# Patient Record
Sex: FEMALE | Race: WHITE
[De-identification: names, ages, dates, MRNs, and addresses within clinical notes are randomized per-mention and may not be internally consistent; named-entity substitution may affect disease eponyms.]

---
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
## 2021-06-04 ENCOUNTER — APPOINTMENT (OUTPATIENT)
Dept: RADIOLOGY | Facility: MEDICAL CENTER | Age: 21
End: 2021-06-04
Attending: EMERGENCY MEDICINE
Payer: COMMERCIAL

## 2021-06-04 ENCOUNTER — HOSPITAL ENCOUNTER (EMERGENCY)
Facility: MEDICAL CENTER | Age: 21
End: 2021-06-04
Attending: EMERGENCY MEDICINE
Payer: COMMERCIAL

## 2021-06-04 VITALS
HEIGHT: 71 IN | DIASTOLIC BLOOD PRESSURE: 68 MMHG | TEMPERATURE: 97.7 F | HEART RATE: 62 BPM | OXYGEN SATURATION: 98 % | RESPIRATION RATE: 18 BRPM | BODY MASS INDEX: 26.6 KG/M2 | WEIGHT: 190 LBS | SYSTOLIC BLOOD PRESSURE: 128 MMHG
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
## 2021-06-04 DIAGNOSIS — T07.XXXA MULTIPLE CONTUSIONS: ICD-10-CM

## 2021-06-04 DIAGNOSIS — S13.4XXA WHIPLASH INJURY TO NECK, INITIAL ENCOUNTER: ICD-10-CM

## 2021-06-04 LAB
ABO GROUP BLD: NORMAL
ALBUMIN SERPL BCP-MCNC: 4 G/DL (ref 3.2–4.9)
ALBUMIN/GLOB SERPL: 1.7 G/DL
ALP SERPL-CCNC: 59 U/L (ref 30–99)
ALT SERPL-CCNC: 32 U/L (ref 2–50)
ANION GAP SERPL CALC-SCNC: 11 MMOL/L (ref 7–16)
AST SERPL-CCNC: 33 U/L (ref 12–45)
BILIRUB SERPL-MCNC: 0.5 MG/DL (ref 0.1–1.5)
BLD GP AB SCN SERPL QL: NORMAL
BUN SERPL-MCNC: 4 MG/DL (ref 8–22)
CALCIUM SERPL-MCNC: 8.9 MG/DL (ref 8.5–10.5)
CHLORIDE SERPL-SCNC: 111 MMOL/L (ref 96–112)
CO2 SERPL-SCNC: 19 MMOL/L (ref 20–33)
CREAT SERPL-MCNC: 0.7 MG/DL (ref 0.5–1.4)
ERYTHROCYTE [DISTWIDTH] IN BLOOD BY AUTOMATED COUNT: 38.5 FL (ref 35.9–50)
ETHANOL BLD-MCNC: <10.1 MG/DL (ref 0–10)
GLOBULIN SER CALC-MCNC: 2.3 G/DL (ref 1.9–3.5)
GLUCOSE SERPL-MCNC: 119 MG/DL (ref 65–99)
HCG SERPL QL: NEGATIVE
HCT VFR BLD AUTO: 46.4 % (ref 37–47)
HGB BLD-MCNC: 15.7 G/DL (ref 12–16)
MCH RBC QN AUTO: 28.6 PG (ref 27–33)
MCHC RBC AUTO-ENTMCNC: 33.8 G/DL (ref 33.6–35)
MCV RBC AUTO: 84.5 FL (ref 81.4–97.8)
PLATELET # BLD AUTO: 246 K/UL (ref 164–446)
PMV BLD AUTO: 10 FL (ref 9–12.9)
POTASSIUM SERPL-SCNC: 3.4 MMOL/L (ref 3.6–5.5)
PROT SERPL-MCNC: 6.3 G/DL (ref 6–8.2)
RBC # BLD AUTO: 5.49 M/UL (ref 4.2–5.4)
RH BLD: NORMAL
SODIUM SERPL-SCNC: 141 MMOL/L (ref 135–145)
WBC # BLD AUTO: 7.1 K/UL (ref 4.8–10.8)

## 2021-06-04 PROCEDURE — 700117 HCHG RX CONTRAST REV CODE 255: Performed by: EMERGENCY MEDICINE

## 2021-06-04 PROCEDURE — 86901 BLOOD TYPING SEROLOGIC RH(D): CPT

## 2021-06-04 PROCEDURE — 99285 EMERGENCY DEPT VISIT HI MDM: CPT

## 2021-06-04 PROCEDURE — 72131 CT LUMBAR SPINE W/O DYE: CPT

## 2021-06-04 PROCEDURE — 72128 CT CHEST SPINE W/O DYE: CPT

## 2021-06-04 PROCEDURE — 73560 X-RAY EXAM OF KNEE 1 OR 2: CPT | Mod: RT

## 2021-06-04 PROCEDURE — 80053 COMPREHEN METABOLIC PANEL: CPT

## 2021-06-04 PROCEDURE — 96374 THER/PROPH/DIAG INJ IV PUSH: CPT

## 2021-06-04 PROCEDURE — 86900 BLOOD TYPING SEROLOGIC ABO: CPT

## 2021-06-04 PROCEDURE — 70450 CT HEAD/BRAIN W/O DYE: CPT

## 2021-06-04 PROCEDURE — 71260 CT THORAX DX C+: CPT

## 2021-06-04 PROCEDURE — 84703 CHORIONIC GONADOTROPIN ASSAY: CPT

## 2021-06-04 PROCEDURE — 700111 HCHG RX REV CODE 636 W/ 250 OVERRIDE (IP): Performed by: EMERGENCY MEDICINE

## 2021-06-04 PROCEDURE — 82077 ASSAY SPEC XCP UR&BREATH IA: CPT

## 2021-06-04 PROCEDURE — 86850 RBC ANTIBODY SCREEN: CPT

## 2021-06-04 PROCEDURE — 72125 CT NECK SPINE W/O DYE: CPT

## 2021-06-04 PROCEDURE — 71045 X-RAY EXAM CHEST 1 VIEW: CPT

## 2021-06-04 PROCEDURE — 85027 COMPLETE CBC AUTOMATED: CPT

## 2021-06-04 PROCEDURE — 305948 HCHG GREEN TRAUMA ACT PRE-NOTIFY NO CC

## 2021-06-04 RX ORDER — IBUPROFEN 600 MG/1
600 TABLET ORAL EVERY 6 HOURS PRN
Qty: 30 TABLET | Refills: 0 | Status: SHIPPED | OUTPATIENT
Start: 2021-06-04 | End: 2021-06-04 | Stop reason: SDUPTHER

## 2021-06-04 RX ORDER — IBUPROFEN 600 MG/1
600 TABLET ORAL EVERY 6 HOURS PRN
Qty: 30 TABLET | Refills: 0 | Status: SHIPPED | OUTPATIENT
Start: 2021-06-04 | End: 2021-10-06

## 2021-06-04 RX ORDER — CYCLOBENZAPRINE HCL 10 MG
10 TABLET ORAL 3 TIMES DAILY PRN
Qty: 30 TABLET | Refills: 0 | Status: SHIPPED | OUTPATIENT
Start: 2021-06-04 | End: 2021-10-06

## 2021-06-04 RX ORDER — CYCLOBENZAPRINE HCL 10 MG
10 TABLET ORAL 3 TIMES DAILY PRN
Qty: 30 TABLET | Refills: 0 | Status: SHIPPED | OUTPATIENT
Start: 2021-06-04 | End: 2021-06-04 | Stop reason: SDUPTHER

## 2021-06-04 RX ADMIN — IOHEXOL 100 ML: 350 INJECTION, SOLUTION INTRAVENOUS at 14:24

## 2021-06-04 RX ADMIN — FENTANYL CITRATE 100 MCG: 50 INJECTION, SOLUTION INTRAMUSCULAR; INTRAVENOUS at 13:42
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
## 2021-06-11 PROBLEM — S62.336A DISPLACED FRACTURE OF NECK OF FIFTH METACARPAL BONE, RIGHT HAND, INITIAL ENCOUNTER FOR CLOSED FRACTURE: Status: ACTIVE | Noted: 2021-06-11

## 2021-10-06 ENCOUNTER — OFFICE VISIT (OUTPATIENT)
Dept: URGENT CARE | Facility: CLINIC | Age: 21
End: 2021-10-06
Payer: COMMERCIAL

## 2021-10-06 ENCOUNTER — APPOINTMENT (OUTPATIENT)
Dept: RADIOLOGY | Facility: IMAGING CENTER | Age: 21
End: 2021-10-06
Attending: PHYSICIAN ASSISTANT
Payer: COMMERCIAL

## 2021-10-06 ENCOUNTER — HOSPITAL ENCOUNTER (EMERGENCY)
Facility: MEDICAL CENTER | Age: 21
End: 2021-10-07
Attending: EMERGENCY MEDICINE
Payer: COMMERCIAL

## 2021-10-06 VITALS
WEIGHT: 186.6 LBS | HEART RATE: 79 BPM | HEIGHT: 71 IN | DIASTOLIC BLOOD PRESSURE: 80 MMHG | OXYGEN SATURATION: 98 % | RESPIRATION RATE: 16 BRPM | TEMPERATURE: 98.1 F | BODY MASS INDEX: 26.12 KG/M2 | SYSTOLIC BLOOD PRESSURE: 120 MMHG

## 2021-10-06 DIAGNOSIS — S69.91XA HAND INJURY, RIGHT, INITIAL ENCOUNTER: ICD-10-CM

## 2021-10-06 DIAGNOSIS — S62.397A OTHER FRACTURE OF FIFTH METACARPAL BONE, LEFT HAND, INITIAL ENCOUNTER FOR CLOSED FRACTURE: ICD-10-CM

## 2021-10-06 DIAGNOSIS — S62.339A CLOSED BOXER'S FRACTURE, INITIAL ENCOUNTER: ICD-10-CM

## 2021-10-06 PROCEDURE — 99215 OFFICE O/P EST HI 40 MIN: CPT | Performed by: PHYSICIAN ASSISTANT

## 2021-10-06 PROCEDURE — 700101 HCHG RX REV CODE 250: Performed by: EMERGENCY MEDICINE

## 2021-10-06 PROCEDURE — 99283 EMERGENCY DEPT VISIT LOW MDM: CPT

## 2021-10-06 PROCEDURE — 73130 X-RAY EXAM OF HAND: CPT | Mod: TC,RT | Performed by: PHYSICIAN ASSISTANT

## 2021-10-06 PROCEDURE — 26605 TREAT METACARPAL FRACTURE: CPT

## 2021-10-06 RX ORDER — BUPIVACAINE HYDROCHLORIDE AND EPINEPHRINE 5; 5 MG/ML; UG/ML
10 INJECTION, SOLUTION EPIDURAL; INTRACAUDAL; PERINEURAL ONCE
Status: COMPLETED | OUTPATIENT
Start: 2021-10-06 | End: 2021-10-06

## 2021-10-06 RX ADMIN — BUPIVACAINE HYDROCHLORIDE AND EPINEPHRINE BITARTRATE 10 ML: 5; .005 INJECTION, SOLUTION EPIDURAL; INTRACAUDAL; PERINEURAL at 22:30

## 2021-10-06 ASSESSMENT — FIBROSIS 4 INDEX
FIB4 SCORE: 0.47
FIB4 SCORE: 0.47

## 2021-10-07 ENCOUNTER — APPOINTMENT (OUTPATIENT)
Dept: RADIOLOGY | Facility: MEDICAL CENTER | Age: 21
End: 2021-10-07
Attending: EMERGENCY MEDICINE
Payer: COMMERCIAL

## 2021-10-07 VITALS
HEIGHT: 68 IN | BODY MASS INDEX: 28.4 KG/M2 | OXYGEN SATURATION: 99 % | HEART RATE: 68 BPM | TEMPERATURE: 97.2 F | DIASTOLIC BLOOD PRESSURE: 70 MMHG | RESPIRATION RATE: 18 BRPM | SYSTOLIC BLOOD PRESSURE: 107 MMHG | WEIGHT: 187.39 LBS

## 2021-10-07 PROCEDURE — 73120 X-RAY EXAM OF HAND: CPT | Mod: RT

## 2021-10-07 ASSESSMENT — ENCOUNTER SYMPTOMS
NAUSEA: 0
FEVER: 0
TINGLING: 1
SENSORY CHANGE: 1
VOMITING: 0
NUMBNESS: 1
CHILLS: 0
WEAKNESS: 0
ARTHRALGIAS: 1
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
## 2021-10-07 NOTE — PROGRESS NOTES
"Subjective     Clair Garrett is a 21 y.o. female who presents with Hand Injury ((R) punched window, limited movement in the hand)            Hand Injury  This is a new problem. The current episode started today (Patient punched a window a few hours ago.. Now has right hand pain and swelling). The problem occurs constantly. The problem has been unchanged. Associated symptoms include arthralgias (right hand pain) and numbness (numbness and sensory loss to right 3rd-5th digits). Pertinent negatives include no chills, fever, nausea, rash, vomiting or weakness. The symptoms are aggravated by bending (palpation, movement). She has tried ice for the symptoms. The treatment provided mild relief.     Past Medical History:   Diagnosis Date   • ADHD    • ADHD (attention deficit hyperactivity disorder) 4/17/2011   • Bipolar disorder (HCC)    • Mood disorder (HCC) 4/17/2011   • Sensory disorder        Past Surgical History:   Procedure Laterality Date   • TONSILLECTOMY         No family history on file.    No Known Allergies    Medications, Allergies, and current problem list reviewed today in Epic        Review of Systems   Constitutional: Negative for chills and fever.   Gastrointestinal: Negative for nausea and vomiting.   Musculoskeletal: Positive for arthralgias (right hand pain) and joint pain (right hand pain).   Skin: Negative for rash.   Neurological: Positive for tingling, sensory change and numbness (numbness and sensory loss to right 3rd-5th digits). Negative for weakness.     All other systems reviewed and are negative.              Objective     /80   Pulse 79   Temp 36.7 °C (98.1 °F) (Temporal)   Resp 16   Ht 1.803 m (5' 11\")   Wt 84.6 kg (186 lb 9.6 oz)   SpO2 98%   BMI 26.03 kg/m²      Physical Exam  Constitutional:       General: She is not in acute distress.  HENT:      Head: Normocephalic and atraumatic.   Eyes:      Conjunctiva/sclera: Conjunctivae normal.   Cardiovascular:      Rate " and Rhythm: Normal rate.   Pulmonary:      Effort: Pulmonary effort is normal. No respiratory distress.   Musculoskeletal:        Hands:    Skin:     General: Skin is warm and dry.      Findings: Bruising present. No erythema.   Neurological:      General: No focal deficit present.      Mental Status: She is alert and oriented to person, place, and time.   Psychiatric:         Mood and Affect: Mood normal.         Behavior: Behavior normal.         Thought Content: Thought content normal.         Judgment: Judgment normal.                         10/6/2021 8:24 PM     HISTORY/REASON FOR EXAM: Pain/Deformity Following Trauma     TECHNIQUE/EXAM DESCRIPTION:  AP, lateral, and oblique views of the RIGHT hand.     COMPARISON:  None     FINDINGS:     There is fifth metacarpal neck fracture with apex dorsal angulation. Overlying soft tissue swung is seen.     IMPRESSION:        1.  Fifth metacarpal boxer's fracture    Assessment & Plan        1. Closed boxer's fracture, initial encounter  X-ray reviewed by me  Angulation about 70 degrees- threshold is 40 degrees  Patient referred to ED for ortho consult and likely reduction. Patient agrees to go to ER.  Patient placed in ace wrap and splint.  - DX-HAND 3+ RIGHT; Future         Differential diagnoses, Supportive care, and indications for immediate follow-up discussed with patient.   Pathogenesis of diagnosis discussed including typical length and natural progression.   Instructed to return to clinic or nearest emergency department for any change in condition, further concerns, or worsening of symptoms.    The patient demonstrated a good understanding and agreed with the treatment plan.    Simi Toscano P.A.-C.

## 2021-10-07 NOTE — ED PROVIDER NOTES
ED Provider Note      Means of Arrival: Private Vehicle  History obtained from: Patient, medical record    CHIEF COMPLAINT  Chief Complaint   Patient presents with   • Hand Pain     Patient report of right hand swelling and pain after hitting a car window.  Seen at the Urgent Care and sent here for further evaluation.    • Hand Swelling       HPI  Clair Garrett is a 21 y.o. female who presents with right hand pain.  Patient punched a car window earlier in the day.  She reports pain to the ulnar aspect of her right hand.  She went to urgent care was diagnosed with a boxer's fracture but there was too much angulation and they sent her here for further intervention.  She denies any other injuries.    REVIEW OF SYSTEMS  CONSTITUTIONAL:  No fever.  CARDIOVASCULAR:  No chest discomfort.  RESPIRATORY:  No pleuritic chest pain.  GASTROINTESTINAL:  No abdominal pain.  GENITOURINARY:   No dysuria.    See HPI for further details.   All other systems are negative.     PAST MEDICAL HISTORY  Past Medical History:   Diagnosis Date   • ADHD    • ADHD (attention deficit hyperactivity disorder) 4/17/2011   • Bipolar disorder (HCC)    • Mood disorder (HCC) 4/17/2011   • Sensory disorder        FAMILY HISTORY  History reviewed. No pertinent family history.    SOCIAL HISTORY   reports that she has been smoking cigarettes. She started smoking about 7 years ago. She has been smoking about 1.00 pack per day. She has never used smokeless tobacco. She reports that she does not drink alcohol and does not use drugs.    SURGICAL HISTORY  Past Surgical History:   Procedure Laterality Date   • TONSILLECTOMY         CURRENT MEDICATIONS  Home Medications     Reviewed by Shivani Pascal R.N. (Registered Nurse) on 10/06/21 at 8432  Med List Status: Unable to Obtain   Medication Last Dose Status   cyclobenzaprine (FLEXERIL) 10 mg Tab  Active   hydrOXYzine HCl (ATARAX) 50 MG Tab  Active   ibuprofen (MOTRIN) 600 MG Tab  Active   lamoTRIgine  "(LAMICTAL) 100 MG Tab  Active   melatonin 3 MG Tab  Active   sertraline (ZOLOFT) 50 MG Tab  Active   topiramate (TOPAMAX) 100 MG Tab  Active                ALLERGIES  No Known Allergies    PHYSICAL EXAM  VITAL SIGNS: /70   Pulse 68   Temp 36.2 °C (97.2 °F) (Temporal)   Resp 18   Ht 1.727 m (5' 8\")   Wt 85 kg (187 lb 6.3 oz)   LMP  (LMP Unknown)   SpO2 99%   BMI 28.49 kg/m²    Gen: Alert  HENT: ATNC  Eyes: Normal conjunctiva  Neck: trachea midline  Resp: no respiratory distress  CV: No JVD, RRR  Abd: non-distended  Ext: No deformities swelling and tenderness over right fifth metacarpal.  Distal CSM intact  Psych: normal mood  Neuro: speech fluent.  Moves all extremities, GCS 15      RADIOLOGY/PROCEDURES  DX-HAND 2- RIGHT   Final Result         1.  Fifth metacarpal head and neck fracture with interval reduction      Review of x-ray from outside record demonstrates angulated fifth metacarpal fracture.  Closed.    Procedure fracture reduction  Indication: Angulated closed fifth metacarpal fracture.  Anesthesia: Hematoma block of 0.5% bupivacaine with epinephrine, 3 mL  CSM intact before and after procedure.  Direct manipulation was used to better align the fragments of the fracture.  There were no immediate complications    Procedure physician applied splint  Indication: Fifth metacarpal fracture.  After reduction, an ulnar gutter splint was applied using plaster.  Postreduction films obtained.  There were no immediate complications.  Distal CSM intact after application        COURSE & MEDICAL DECISION MAKING  Pertinent Labs & Imaging studies reviewed. (See chart for details)    Patient presents with fifth metacarpal fracture.  There is some angulation on the initial x-ray.  This was attempted to be better aligned and placed into an ulnar gutter splint.  Patient referred to orthopedics for follow-up.    The patient was given return precautions, anticipatory guidance, and the opportunity to ask questions " prior to discharge.     Appropriate PPE were worn at this encounter.     FINAL IMPRESSION  1. Other fracture of fifth metacarpal bone, left hand, initial encounter for closed fracture         DISPOSITION:  Patient will be discharged home in stable condition.    FOLLOW UP:  [facility]  [address]  [facility] [phone]  Schedule an appointment as soon as possible for a visit       [facility], Emergency Dept  [address]  [facility] [address] [phone]    If symptoms worsen        This dictation was created using voice recognition software. The accuracy of the dictation is limited to the abilities of the software. I expect there may be some errors of grammar and possibly content. The nursing notes were reviewed and certain aspects of this information were incorporated into this note.

## 2021-10-07 NOTE — DISCHARGE INSTRUCTIONS
You were seen in the emergency department with hand pain.  Your broken bones was reduced into better alignment.  Please keep the splint on until you follow-up with orthopedics.    Please keep your hand elevated.  You may also use ice to help reduce pain and swelling.        For pain you can take acetaminophen (Tylenol), 1000mg every 8 hours as needed for pain. Do not take more than 3000mg of acetaminophen in any 24 hour period.  As long as you are not pregnant, you can also take  ibuprofen (Motrin), 600mg every 6 hours as needed for pain (take with food to avoid GI upset).  Taking these medications regularly during the day can be very effective in controlling pain.    Please return to the emergency department or seek medical attention if you develop:  Cold dusky fingers, uncontrollable pain, any other new or concerning findings

## 2021-10-07 NOTE — ED NOTES
MD re-evaluated pt and she is now clear for d/c  dischg instructions given to pt  Verbally understands  D/c'ed to home in NAD mother driving home  Referral for ortho given  Both mother and pt aware to f/u with them for further care and treatment of injury     Frederic Mayer(Resident)

## 2021-10-22 ENCOUNTER — HOSPITAL ENCOUNTER (OUTPATIENT)
Facility: MEDICAL CENTER | Age: 21
End: 2021-10-22
Attending: PHYSICIAN ASSISTANT
Payer: COMMERCIAL

## 2021-10-22 ENCOUNTER — OFFICE VISIT (OUTPATIENT)
Dept: URGENT CARE | Facility: PHYSICIAN GROUP | Age: 21
End: 2021-10-22
Payer: COMMERCIAL

## 2021-10-22 VITALS
TEMPERATURE: 98 F | OXYGEN SATURATION: 97 % | DIASTOLIC BLOOD PRESSURE: 80 MMHG | RESPIRATION RATE: 18 BRPM | HEART RATE: 78 BPM | WEIGHT: 184 LBS | SYSTOLIC BLOOD PRESSURE: 120 MMHG | BODY MASS INDEX: 25.76 KG/M2 | HEIGHT: 71 IN

## 2021-10-22 DIAGNOSIS — R51.9 NONINTRACTABLE HEADACHE, UNSPECIFIED CHRONICITY PATTERN, UNSPECIFIED HEADACHE TYPE: ICD-10-CM

## 2021-10-22 DIAGNOSIS — Z20.822 CLOSE EXPOSURE TO COVID-19 VIRUS: ICD-10-CM

## 2021-10-22 LAB
EXTERNAL QUALITY CONTROL: NORMAL
SARS-COV+SARS-COV-2 AG RESP QL IA.RAPID: NEGATIVE

## 2021-10-22 PROCEDURE — U0003 INFECTIOUS AGENT DETECTION BY NUCLEIC ACID (DNA OR RNA); SEVERE ACUTE RESPIRATORY SYNDROME CORONAVIRUS 2 (SARS-COV-2) (CORONAVIRUS DISEASE [COVID-19]), AMPLIFIED PROBE TECHNIQUE, MAKING USE OF HIGH THROUGHPUT TECHNOLOGIES AS DESCRIBED BY CMS-2020-01-R: HCPCS

## 2021-10-22 PROCEDURE — 99213 OFFICE O/P EST LOW 20 MIN: CPT | Mod: CS | Performed by: PHYSICIAN ASSISTANT

## 2021-10-22 PROCEDURE — 87426 SARSCOV CORONAVIRUS AG IA: CPT | Performed by: PHYSICIAN ASSISTANT

## 2021-10-22 PROCEDURE — U0005 INFEC AGEN DETEC AMPLI PROBE: HCPCS

## 2021-10-22 ASSESSMENT — ENCOUNTER SYMPTOMS
DIZZINESS: 1
FEVER: 0
SPUTUM PRODUCTION: 0
HEADACHES: 1
WHEEZING: 0
CHILLS: 1
ABDOMINAL PAIN: 0
VOMITING: 0
SORE THROAT: 0
NAUSEA: 0
COUGH: 0
DIARRHEA: 1
MYALGIAS: 1
SHORTNESS OF BREATH: 0

## 2021-10-22 ASSESSMENT — FIBROSIS 4 INDEX: FIB4 SCORE: 0.47

## 2021-10-22 NOTE — PROGRESS NOTES
"Subjective:   Clair Garrett  is a 21 y.o. female who presents for Dizziness (migraine, dizzy, loss of taste, exposed to covid since monday. requesting a covid test. )      Dizziness  This is a new problem. The current episode started in the past 7 days (5d). Associated symptoms include chills, congestion, headaches and myalgias. Pertinent negatives include no abdominal pain, coughing, fever, nausea, rash, sore throat or vomiting.   Patient presents urgent care complaining of last 5 days of symptoms concerning for COVID-19.  Her mother is positive for Covid at home and patient has been in her proximity over this interval.  She notes some headache with mild dizziness associated.  She has a history of migraine similar symptoms.  She denies ear pain or sore throat.  She denies cough.  She complains of body aches chills diarrhea.  She denies nausea vomiting or abdominal pain.  She denies history of Covid vaccination.  Denies loss of smell but has had abnormal taste for the last few days.  Denies history of asthma bronchitis or pneumonia.    Review of Systems   Constitutional: Positive for chills. Negative for fever.   HENT: Positive for congestion. Negative for ear pain and sore throat.    Respiratory: Negative for cough, sputum production, shortness of breath and wheezing.    Gastrointestinal: Positive for diarrhea. Negative for abdominal pain, nausea and vomiting.   Musculoskeletal: Positive for myalgias.   Skin: Negative for rash.   Neurological: Positive for dizziness and headaches.       No Known Allergies     Objective:   /80   Pulse 78   Temp 36.7 °C (98 °F) (Temporal)   Resp 18   Ht 1.803 m (5' 11\")   Wt 83.5 kg (184 lb)   LMP  (LMP Unknown)   SpO2 97%   BMI 25.66 kg/m²     Physical Exam  Vitals and nursing note reviewed.   Constitutional:       General: She is not in acute distress.     Appearance: She is well-developed. She is not diaphoretic.   HENT:      Head: Normocephalic and " atraumatic.      Right Ear: Tympanic membrane, ear canal and external ear normal.      Left Ear: Tympanic membrane, ear canal and external ear normal.      Nose: Nose normal.      Mouth/Throat:      Pharynx: Uvula midline. Posterior oropharyngeal erythema ( mild PND) present. No oropharyngeal exudate.      Tonsils: No tonsillar abscesses.   Eyes:      General: Lids are normal. No scleral icterus.        Right eye: No discharge.         Left eye: No discharge.      Conjunctiva/sclera: Conjunctivae normal.      Right eye: Right conjunctiva is not injected. No exudate or hemorrhage.     Left eye: Left conjunctiva is not injected. No exudate or hemorrhage.     Pupils: Pupils are equal, round, and reactive to light.   Pulmonary:      Effort: Pulmonary effort is normal. No respiratory distress.      Breath sounds: No decreased breath sounds, wheezing, rhonchi or rales.   Musculoskeletal:         General: Normal range of motion.      Cervical back: Neck supple.   Lymphadenopathy:      Cervical: Cervical adenopathy ( mild bilat) present.   Skin:     General: Skin is warm and dry.      Coloration: Skin is not pale.      Findings: No erythema.   Neurological:      Mental Status: She is alert and oriented to person, place, and time. She is not disoriented.      Cranial Nerves: No cranial nerve deficit.      Sensory: No sensory deficit.      Coordination: Coordination normal.      Gait: Gait normal.   Psychiatric:         Speech: Speech normal.         Behavior: Behavior normal.     POCT COVID NEG  PCR COVID pending    Assessment/Plan:   1. Nonintractable headache, unspecified chronicity pattern, unspecified headache type    2. Close exposure to COVID-19 virus  - POCT SARS-COV Antigen CHRIS (Symptomatic Only)  - COVID/SARS CoV-2 PCR; Future  Supportive care is reviewed with patient/caregiver - recommend to push PO fluids and electrolytes, over-the-counter symptom support medications reviewed, ER precautions with worsened  symptoms, quarantine recommendations reviewed, sent with letter, [facility] for results of testing  Return to clinic with lack of resolution or progression of symptoms.  ER precautions with any worsening symptoms are reviewed with patient/caregiver and they do express understanding      I have worn an N95 mask, gloves and eye protection for the entire encounter with this patient.     Differential diagnosis, natural history, supportive care, and indications for immediate follow-up discussed.

## 2021-10-23 DIAGNOSIS — Z20.822 CLOSE EXPOSURE TO COVID-19 VIRUS: ICD-10-CM

## 2021-10-23 LAB — COVID ORDER STATUS COVID19: NORMAL

## 2021-10-24 LAB
SARS-COV-2 RNA RESP QL NAA+PROBE: DETECTED
SPECIMEN SOURCE: ABNORMAL

## 2022-02-03 ENCOUNTER — OFFICE VISIT (OUTPATIENT)
Dept: URGENT CARE | Facility: PHYSICIAN GROUP | Age: 22
End: 2022-02-03
Payer: COMMERCIAL

## 2022-02-03 VITALS
RESPIRATION RATE: 12 BRPM | WEIGHT: 175 LBS | SYSTOLIC BLOOD PRESSURE: 122 MMHG | HEART RATE: 65 BPM | TEMPERATURE: 97.7 F | BODY MASS INDEX: 26.52 KG/M2 | OXYGEN SATURATION: 98 % | HEIGHT: 68 IN | DIASTOLIC BLOOD PRESSURE: 72 MMHG

## 2022-02-03 DIAGNOSIS — J02.9 SORE THROAT: ICD-10-CM

## 2022-02-03 DIAGNOSIS — J02.9 PHARYNGITIS, UNSPECIFIED ETIOLOGY: ICD-10-CM

## 2022-02-03 PROCEDURE — 99213 OFFICE O/P EST LOW 20 MIN: CPT | Performed by: PHYSICIAN ASSISTANT

## 2022-02-03 RX ORDER — OMEPRAZOLE 20 MG/1
CAPSULE, DELAYED RELEASE ORAL
COMMUNITY
End: 2022-02-03

## 2022-02-03 RX ORDER — HYDROXYZINE 50 MG/1
TABLET, FILM COATED ORAL
COMMUNITY
End: 2022-02-03

## 2022-02-03 RX ORDER — AMOXICILLIN 875 MG/1
875 TABLET, COATED ORAL 2 TIMES DAILY
Qty: 20 TABLET | Refills: 0 | Status: SHIPPED | OUTPATIENT
Start: 2022-02-03 | End: 2022-02-13

## 2022-02-03 RX ORDER — IBUPROFEN 600 MG/1
TABLET ORAL
COMMUNITY
End: 2022-05-12

## 2022-02-03 ASSESSMENT — ENCOUNTER SYMPTOMS
SWOLLEN GLANDS: 1
EYE REDNESS: 0
VOMITING: 0
FEVER: 0
EYE DISCHARGE: 0
SORE THROAT: 1
DIARRHEA: 0
WHEEZING: 0
COUGH: 0
MYALGIAS: 0

## 2022-02-03 ASSESSMENT — FIBROSIS 4 INDEX: FIB4 SCORE: 0.47

## 2022-02-03 NOTE — LETTER
February 3, 2022    To Whom It May Concern:         This is confirmation that Clair Garrett attended her scheduled appointment with Morro Macedo P.A.-C. on 2/03/22. Please excuse this patient from work today and tomorrow due to recent illness.          If you have any questions please do not hesitate to call me at the phone number listed below.    Sincerely,          Morro Macedo P.A.-C.  [phone]

## 2022-02-03 NOTE — PROGRESS NOTES
"Subjective     Clair Garrett is a 21 y.o. female who presents with Illness (x1week sore throat, congestion , covid test came back negative)            Patient is a 21-year-old female who presents to urgent care with residual sore throat.  Patient updates me and reports that she has been ill for approximately the last 10 days at which she was tested for COVID-19 with negative rapid test of which then a PCR test was sent and was -5 days ago.  Patient reports that her body aches, fevers and slight drainage all have improved however she continues to have a residual sore throat.  Patient does report that she has had her tonsils out however prior to such she would have recurrent strep of which this feels very similar.  She does report swelling into her neck and pain as well.  She also reports painful swallowing.  She has been taking over-the-counter cold therapies with mild improvement of symptoms.    Pharyngitis   This is a new problem. The current episode started 1 to 4 weeks ago. The problem has been unchanged. Maximum temperature: Previous fevers-resolved. Associated symptoms include swollen glands. Pertinent negatives include no coughing, diarrhea or vomiting. Treatments tried: As above.       Review of Systems   Constitutional: Negative for fever and malaise/fatigue.   HENT: Positive for sore throat.    Eyes: Negative for discharge and redness.   Respiratory: Negative for cough and wheezing.    Gastrointestinal: Negative for diarrhea and vomiting.   Musculoskeletal: Negative for myalgias.   All other systems reviewed and are negative.             Objective     /72 (BP Location: Right arm, Patient Position: Sitting, BP Cuff Size: Adult)   Pulse 65   Temp 36.5 °C (97.7 °F) (Temporal)   Resp 12   Ht 1.727 m (5' 8\")   Wt 79.4 kg (175 lb)   SpO2 98%   BMI 26.61 kg/m²      Physical Exam  Vitals reviewed.   Constitutional:       General: She is not in acute distress.     Appearance: She is " well-developed.   HENT:      Head: Normocephalic and atraumatic.      Right Ear: External ear normal.      Left Ear: External ear normal.      Nose: No congestion.      Mouth/Throat:      Pharynx: Posterior oropharyngeal erythema present.      Comments: Uvula is midline.  Tonsils are absent.  Without evidence of abscess formation.  Eyes:      Conjunctiva/sclera: Conjunctivae normal.      Pupils: Pupils are equal, round, and reactive to light.   Neck:      Trachea: No tracheal deviation.   Cardiovascular:      Rate and Rhythm: Normal rate and regular rhythm.   Pulmonary:      Effort: Pulmonary effort is normal.      Breath sounds: Normal breath sounds.   Musculoskeletal:         General: Normal range of motion.      Cervical back: Normal range of motion and neck supple.   Lymphadenopathy:      Cervical: Cervical adenopathy present.   Skin:     General: Skin is warm.      Findings: No rash.      Comments: No rash to area exposed during the visit today.    Neurological:      Mental Status: She is alert and oriented to person, place, and time.      Coordination: Coordination normal.   Psychiatric:         Behavior: Behavior normal.         Thought Content: Thought content normal.         Judgment: Judgment normal.                             Assessment & Plan        1. Pharyngitis, unspecified etiology  2. Sore throat  - amoxicillin (AMOXIL) 875 MG tablet; Take 1 Tablet by mouth 2 times a day for 10 days.  Dispense: 20 Tablet; Refill: 0            Patient with high Centor criteria at this time.  Patient previous PCR for Covid has been negative.  Testing for strep was deferred as we currently do not have rapid strep in clinic-clinically with high Centor criteria on physical examination we will start the patient on the above.  Appropriate PPE worn at all times by provider.   Pt. Had face mask on throughout entirety of the visit other than oropharyngeal examination today.     Side effects of OTC or prescribed medications  discussed.     DDX, Supportive care, and indications for immediate follow-up discussed with patient.    Instructed to return to clinic or nearest emergency department if we are not available for any change in condition, further concerns, or worsening of symptoms.    The patient and/or guardian demonstrated a good understanding and agreed with the treatment plan.    Please note that this dictation was created using voice recognition software. I have made every reasonable attempt to correct obvious errors, but I expect that there are errors of grammar and possibly content that I did not discover before finalizing the note.

## 2022-05-12 ENCOUNTER — OFFICE VISIT (OUTPATIENT)
Dept: URGENT CARE | Facility: PHYSICIAN GROUP | Age: 22
End: 2022-05-12
Payer: COMMERCIAL

## 2022-05-12 VITALS
SYSTOLIC BLOOD PRESSURE: 118 MMHG | BODY MASS INDEX: 28.49 KG/M2 | HEIGHT: 70 IN | TEMPERATURE: 96.4 F | HEART RATE: 74 BPM | RESPIRATION RATE: 20 BRPM | OXYGEN SATURATION: 96 % | WEIGHT: 199 LBS | DIASTOLIC BLOOD PRESSURE: 84 MMHG

## 2022-05-12 DIAGNOSIS — Z91.09 ENVIRONMENTAL ALLERGIES: ICD-10-CM

## 2022-05-12 DIAGNOSIS — J02.9 SORE THROAT: ICD-10-CM

## 2022-05-12 DIAGNOSIS — M79.10 MYALGIA: ICD-10-CM

## 2022-05-12 DIAGNOSIS — B34.9 VIRAL ILLNESS: ICD-10-CM

## 2022-05-12 LAB
EXTERNAL QUALITY CONTROL: NORMAL
SARS-COV+SARS-COV-2 AG RESP QL IA.RAPID: NEGATIVE

## 2022-05-12 PROCEDURE — 87426 SARSCOV CORONAVIRUS AG IA: CPT

## 2022-05-12 PROCEDURE — 99213 OFFICE O/P EST LOW 20 MIN: CPT

## 2022-05-12 ASSESSMENT — ENCOUNTER SYMPTOMS
FEVER: 0
SHORTNESS OF BREATH: 0
CHILLS: 0
CARDIOVASCULAR NEGATIVE: 1
SORE THROAT: 1
WHEEZING: 0
COUGH: 1
DIARRHEA: 0
ABDOMINAL PAIN: 0
SPUTUM PRODUCTION: 0
VOMITING: 0
NAUSEA: 0
EYES NEGATIVE: 1

## 2022-05-12 ASSESSMENT — FIBROSIS 4 INDEX: FIB4 SCORE: 0.47

## 2022-05-12 NOTE — PROGRESS NOTES
"Subjective     Clair Garrett is a 21 y.o. female who presents with Body Aches (This morning ), Runny Nose (Post nasal drip; wax waines past week), and Cough (Dry; )        HPI     Patient presents with rhinorrhea and sore throat for a week now. Upon waking up this morning, she reports chills, myalgia, dry cough, sore throat, nasal congestion. She denies any fever, shortness of breath, nausea, vomiting, abdominal pain.  She reports that she was required by work to prevent COVID testing done, before she can return to work.     Patient's current problem list, medications, and past medical/surgical history were reviewed in Epic.    PMH:  has a past medical history of ADHD, ADHD (attention deficit hyperactivity disorder) (4/17/2011), Bipolar disorder ([facility]), Mood disorder ([facility]) (4/17/2011), and Sensory disorder.  MEDS: No current outpatient medications on file.  ALLERGIES: No Known Allergies  SURGHX:   Past Surgical History:   Procedure Laterality Date   • TONSILLECTOMY       SOCHX:  reports that she has been smoking cigarettes. She started smoking about 8 years ago. She has been smoking about 1.00 pack per day. She has never used smokeless tobacco. She reports that she does not drink alcohol and does not use drugs.  FH: Reviewed with patient, not pertinent to this visit.       Review of Systems   Constitutional: Negative for chills, fever and malaise/fatigue.   HENT: Positive for congestion and sore throat.    Eyes: Negative.    Respiratory: Positive for cough. Negative for sputum production, shortness of breath and wheezing.    Cardiovascular: Negative.    Gastrointestinal: Negative for abdominal pain, diarrhea, nausea and vomiting.   Skin: Negative.           Objective     /84 (BP Location: Left arm, Patient Position: Sitting, BP Cuff Size: Adult)   Pulse 74   Temp (!) 35.8 °C (96.4 °F) (Temporal)   Resp 20   Ht 1.778 m (5' 10\")   Wt 90.3 kg (199 lb)   SpO2 96%   BMI 28.55 kg/m²      Physical " Exam  Constitutional:       Appearance: Normal appearance.   HENT:      Head: Normocephalic.      Nose: Congestion present.      Mouth/Throat:      Pharynx: Posterior oropharyngeal erythema present. No pharyngeal swelling.   Eyes:      Extraocular Movements: Extraocular movements intact.   Cardiovascular:      Rate and Rhythm: Normal rate and regular rhythm.      Pulses: Normal pulses.      Heart sounds: Normal heart sounds.   Pulmonary:      Effort: Pulmonary effort is normal.      Breath sounds: Normal breath sounds.   Musculoskeletal:         General: Normal range of motion.      Cervical back: Normal range of motion.   Skin:     General: Skin is warm and dry.   Neurological:      General: No focal deficit present.      Mental Status: She is alert.   Psychiatric:         Mood and Affect: Mood normal.         Behavior: Behavior normal.       Results:  COVID testing- negative         Assessment & Plan      1. Viral illness      2. Myalgia    - POCT SARS-COV Antigen CHRIS manual result ([facility] Only); Standing  - POCT SARS-COV Antigen CHRIS manual result ([facility] Only)    3. Sore throat    - POCT SARS-COV Antigen CHRIS manual result ([facility] Only); Standing  - POCT SARS-COV Antigen CHRIS manual result ([facility] Only)    4. Environmental allergies      Patient is negative for COVID. Patient most likely has viral upper respiratory illness. Discussed treatment plan which is mostly supportive, patient is agreeable and verbalized understanding.  Patient reports environmental allergies.  Recommended taking OTC antihistamine plus steroid nasal spray.  Educated patient on signs and symptoms watch out for, when to return to clinic or go to the ER.     Recommended supportive treatment at home:  OTC Tylenol or Motrin for fever/discomfort.  OTC supportive care for nasal congestion - saline nasal spray/Flonase nasal spray and/or netipot  Humidifier and steam inhalation/warm showers.  Warm saline gargles for sore throat.  Increase oral fluid  intake.    Differential diagnoses, supportive care, and indications for immediate follow-up discussed with patient.    Electronically Signed by JESSICA Garcia

## 2022-05-25 ENCOUNTER — OFFICE VISIT (OUTPATIENT)
Dept: BEHAVIORAL HEALTH | Facility: CLINIC | Age: 22
End: 2022-05-25
Payer: COMMERCIAL

## 2022-05-25 DIAGNOSIS — F34.1 PERSISTENT DEPRESSIVE DISORDER: ICD-10-CM

## 2022-05-25 DIAGNOSIS — F90.9 ATTENTION DEFICIT DISORDER OF ADULT WITH HYPERACTIVITY: ICD-10-CM

## 2022-05-25 DIAGNOSIS — F41.1 GENERALIZED ANXIETY DISORDER: ICD-10-CM

## 2022-05-25 DIAGNOSIS — F43.12 PROLONGED POSTTRAUMATIC STRESS DISORDER: ICD-10-CM

## 2022-05-25 PROCEDURE — 90791 PSYCH DIAGNOSTIC EVALUATION: CPT | Performed by: PSYCHOLOGIST

## 2022-05-26 PROBLEM — F41.1 GENERALIZED ANXIETY DISORDER: Status: ACTIVE | Noted: 2022-05-26

## 2022-05-26 ASSESSMENT — ANXIETY QUESTIONNAIRES
5. BEING SO RESTLESS THAT IT IS HARD TO SIT STILL: NOT AT ALL
3. WORRYING TOO MUCH ABOUT DIFFERENT THINGS: SEVERAL DAYS
6. BECOMING EASILY ANNOYED OR IRRITABLE: MORE THAN HALF THE DAYS
GAD7 TOTAL SCORE: 5
7. FEELING AFRAID AS IF SOMETHING AWFUL MIGHT HAPPEN: NOT AT ALL
IF YOU CHECKED OFF ANY PROBLEMS ON THIS QUESTIONNAIRE, HOW DIFFICULT HAVE THESE PROBLEMS MADE IT FOR YOU TO DO YOUR WORK, TAKE CARE OF THINGS AT HOME, OR GET ALONG WITH OTHER PEOPLE: SOMEWHAT DIFFICULT
1. FEELING NERVOUS, ANXIOUS, OR ON EDGE: NOT AT ALL
2. NOT BEING ABLE TO STOP OR CONTROL WORRYING: SEVERAL DAYS
4. TROUBLE RELAXING: SEVERAL DAYS

## 2022-05-26 ASSESSMENT — PATIENT HEALTH QUESTIONNAIRE - PHQ9
5. POOR APPETITE OR OVEREATING: 3 - NEARLY EVERY DAY
CLINICAL INTERPRETATION OF PHQ2 SCORE: 0
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
## 2022-07-21 ENCOUNTER — HOSPITAL ENCOUNTER (EMERGENCY)
Facility: MEDICAL CENTER | Age: 22
End: 2022-07-22
Attending: STUDENT IN AN ORGANIZED HEALTH CARE EDUCATION/TRAINING PROGRAM
Payer: COMMERCIAL

## 2022-07-21 DIAGNOSIS — Z86.59 HISTORY OF BIPOLAR DISORDER: ICD-10-CM

## 2022-07-21 DIAGNOSIS — Z72.89 DELIBERATE SELF-CUTTING: ICD-10-CM

## 2022-07-21 LAB
AMPHET UR QL SCN: NEGATIVE
BARBITURATES UR QL SCN: NEGATIVE
BENZODIAZ UR QL SCN: NEGATIVE
BZE UR QL SCN: NEGATIVE
CANNABINOIDS UR QL SCN: POSITIVE
METHADONE UR QL SCN: NEGATIVE
OPIATES UR QL SCN: NEGATIVE
OXYCODONE UR QL SCN: NEGATIVE
PCP UR QL SCN: NEGATIVE
POC BREATHALIZER: 0 PERCENT (ref 0–0.01)
PROPOXYPH UR QL SCN: NEGATIVE

## 2022-07-21 PROCEDURE — 99285 EMERGENCY DEPT VISIT HI MDM: CPT

## 2022-07-21 PROCEDURE — 302970 POC BREATHALIZER: Performed by: STUDENT IN AN ORGANIZED HEALTH CARE EDUCATION/TRAINING PROGRAM

## 2022-07-21 PROCEDURE — 700102 HCHG RX REV CODE 250 W/ 637 OVERRIDE(OP): Performed by: STUDENT IN AN ORGANIZED HEALTH CARE EDUCATION/TRAINING PROGRAM

## 2022-07-21 PROCEDURE — 80307 DRUG TEST PRSMV CHEM ANLYZR: CPT

## 2022-07-21 PROCEDURE — A9270 NON-COVERED ITEM OR SERVICE: HCPCS | Performed by: STUDENT IN AN ORGANIZED HEALTH CARE EDUCATION/TRAINING PROGRAM

## 2022-07-21 PROCEDURE — 81025 URINE PREGNANCY TEST: CPT

## 2022-07-21 PROCEDURE — 96374 THER/PROPH/DIAG INJ IV PUSH: CPT

## 2022-07-21 RX ORDER — LORAZEPAM 2 MG/1
2 TABLET ORAL ONCE
Status: COMPLETED | OUTPATIENT
Start: 2022-07-21 | End: 2022-07-21

## 2022-07-21 RX ADMIN — LORAZEPAM 2 MG: 2 TABLET ORAL at 22:02

## 2022-07-21 ASSESSMENT — FIBROSIS 4 INDEX: FIB4 SCORE: 0.47

## 2022-07-22 VITALS
WEIGHT: 176 LBS | SYSTOLIC BLOOD PRESSURE: 118 MMHG | HEART RATE: 66 BPM | RESPIRATION RATE: 18 BRPM | HEIGHT: 69 IN | DIASTOLIC BLOOD PRESSURE: 85 MMHG | BODY MASS INDEX: 26.07 KG/M2 | OXYGEN SATURATION: 96 % | TEMPERATURE: 97 F

## 2022-07-22 LAB — HCG UR QL: NEGATIVE

## 2022-07-22 PROCEDURE — 700111 HCHG RX REV CODE 636 W/ 250 OVERRIDE (IP): Performed by: EMERGENCY MEDICINE

## 2022-07-22 PROCEDURE — 90791 PSYCH DIAGNOSTIC EVALUATION: CPT

## 2022-07-22 RX ORDER — MIDAZOLAM HYDROCHLORIDE 1 MG/ML
2 INJECTION INTRAMUSCULAR; INTRAVENOUS ONCE
Status: COMPLETED | OUTPATIENT
Start: 2022-07-22 | End: 2022-07-22

## 2022-07-22 RX ADMIN — MIDAZOLAM HYDROCHLORIDE 2 MG: 1 INJECTION, SOLUTION INTRAMUSCULAR; INTRAVENOUS at 11:49

## 2022-07-22 NOTE — DISCHARGE PLANNING
Pt accepted for inpt MH tx at  [facility]  today at  1130  accepting MD Rios    Writer RN requested pt transport coordination assistance from [facility] ED , Erika Cross RN notified ED RN Harini

## 2022-07-22 NOTE — ED NOTES
"Rechecked pt's vitals, informed plan of care, transfer to [facility]. Pt yelling and screaming at me \" you lied to me, they said that I am not going inpatient!\" Explained that she is on legal hold and need inpatient treatment. Pt continues to scream , security at bedside for assistance.   at bedside talked to patient , medicated per order.  "
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
## 2022-07-22 NOTE — ED NOTES
Report from VINCENT Martin. Pt resting on gurney, NAD noted, 1:1 sitter at bedside in direct observation of pt.

## 2022-07-22 NOTE — ED NOTES
Pt resting on gurney talking with sitter. NAD noted, respirations even and unlabored.   Pt belongings placed in locker 4.

## 2022-07-22 NOTE — ED NOTES
Pt sleeping on gurney, NAD noted, respirations even and unlabored. 1:1 sitter in direct observation of pt.

## 2022-07-22 NOTE — ED NOTES
Pt visibly upset, asking to call mom. Dr. Hudson okay with pt having phone privileges. Pt provided phone to call mom, 1:1 sitter at bedside and in direct observation of pt.

## 2022-07-22 NOTE — ED NOTES
Pt becoming anxious about having to stay in ED tonight to talk with [facility] and psych. RN attempted to explain process to pt, pt becoming increasingly frustrated with situation. Pt provided stress ball and coloring pages for stress relief and distraction.
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
## 2022-07-22 NOTE — ED NOTES
Pharmacy Medication Reconciliation    ~Unable to complete Medication Reconciliation at this time. Patient is not able to participate in interview per RN request.

## 2022-07-22 NOTE — DISCHARGE PLANNING
Alert Team/Behavioral Health   Note:     Alert Team RN (Ambreen PEPPER) notified writer of acceptance to [facility].  Accepting provider is [facility].  Requested [facility] pick-up time is 130.     Notified Alert Team DPA (Eulalia ESTEBAN).     Alert Team GOMEZ (Eulalia ESTEBAN) to set-up transport.

## 2022-07-22 NOTE — DISCHARGE PLANNING
ALERT team  note:  [facility] ED Behavioral Health Fax Referral      [facility] Behavioral Health Alert Team:  [phone]    Referral: Legal Hold    Intervention: Patient referral to community inpatient  facillity    Legal Hold Initiated: Date: 7/21/22 Time: 1750    Patient’s Insurance Listed on Face Sheet: [facility]    Referrals sent to: [facility]    Referrals faxed by VINCENT Cespedes    This referral contains the following information:  1) Face sheet _x___  2) Page 1 and Page 2 of Legal Hold _x___  3) Alert Team Assessment/Psych Assessment _x___  4) Head to toe physical exam _x___  5) Urine Drug Screen _x___  6) Alcohol __x__  7) Vital signs _x___  8) Pregnancy test when applicable _x__  9) Medications list x____  10) Covid screening ____    Plan: Patient will transfer to mental health facility once acceptance is obtained

## 2022-07-22 NOTE — ED NOTES
Pt sleeping on gurney, NAD noted, respirations even and unlabored. 1:1 sitter in direct observation of pt. Waiting for  consult.

## 2022-07-22 NOTE — ED NOTES
[facility] here to take pt to [facility], report given.  All belongings given to LUD[facility] crew
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
## 2022-07-22 NOTE — ED NOTES
Pt resting on gurney, NAD noted, respirations even and unlabored. 1:1 sitter in direct observation of pt.

## 2022-07-22 NOTE — ED NOTES
Pt becoming increasingly aggressive toward staff, yelling, banging on side rails and throwing stress ball. Security called and at bedside. Pt de-escalated by security and RN. Pt offered prn ativan, pt medicated per MAR. Pt appears calm at this time, 1:1 sitter at bedside.

## 2022-07-22 NOTE — DISCHARGE PLANNING
Legal Hold Transfer     Referral: Legal hold transfer to Mental Health Facility     Intervention: Notified by  Erika that pt has been accepted to [facility].     Pt's accepting physcian is Dr. Rios    [facility] Auth# [id number]     Transport arranged through [facility] at [facility]     The pt will be picked up at 1145      Notified Bedside RN Harini and Alert Team VINCENT Crook of the departure time as well as accepting facility.      Transfer packet created with original legal hold and placed on chart.      Plan: Pt will be transferring to [facility] today at 1145 via [facility].

## 2022-07-22 NOTE — DISCHARGE PLANNING
TCN following. HTH/SCP chart review completed. Collaborated with Alert Team VINCENT Crook. Mbr is on Legal hold as of 7/21/2022 1750-Referral sent to [facility] - [facility] will not actively follow. Will defer mbr to hospital alert team.
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
## 2022-08-03 ENCOUNTER — OFFICE VISIT (OUTPATIENT)
Dept: BEHAVIORAL HEALTH | Facility: CLINIC | Age: 22
End: 2022-08-03
Payer: COMMERCIAL

## 2022-08-03 DIAGNOSIS — F41.1 GENERALIZED ANXIETY DISORDER: ICD-10-CM

## 2022-08-03 DIAGNOSIS — F90.9 ATTENTION DEFICIT HYPERACTIVITY DISORDER (ADHD), UNSPECIFIED ADHD TYPE: ICD-10-CM

## 2022-08-03 PROCEDURE — 90792 PSYCH DIAG EVAL W/MED SRVCS: CPT | Performed by: PSYCHIATRY & NEUROLOGY

## 2022-08-03 RX ORDER — CITALOPRAM HYDROBROMIDE 10 MG/1
10 TABLET ORAL NIGHTLY
Qty: 30 TABLET | Refills: 0 | Status: SHIPPED | OUTPATIENT
Start: 2022-08-03 | End: 2022-09-02
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
## 2022-08-10 ENCOUNTER — OFFICE VISIT (OUTPATIENT)
Dept: BEHAVIORAL HEALTH | Facility: CLINIC | Age: 22
End: 2022-08-10
Payer: COMMERCIAL

## 2022-08-10 DIAGNOSIS — F43.12 PROLONGED POSTTRAUMATIC STRESS DISORDER: ICD-10-CM

## 2022-08-10 DIAGNOSIS — F41.1 GENERALIZED ANXIETY DISORDER: ICD-10-CM

## 2022-08-10 DIAGNOSIS — F90.9 ATTENTION DEFICIT DISORDER OF ADULT WITH HYPERACTIVITY: ICD-10-CM

## 2022-08-10 DIAGNOSIS — F34.1 PERSISTENT DEPRESSIVE DISORDER: ICD-10-CM

## 2022-08-10 PROCEDURE — 90837 PSYTX W PT 60 MINUTES: CPT | Performed by: PSYCHOLOGIST

## 2022-08-17 ENCOUNTER — APPOINTMENT (OUTPATIENT)
Dept: BEHAVIORAL HEALTH | Facility: CLINIC | Age: 22
End: 2022-08-17
Payer: COMMERCIAL

## 2022-08-17 ENCOUNTER — OFFICE VISIT (OUTPATIENT)
Dept: BEHAVIORAL HEALTH | Facility: CLINIC | Age: 22
End: 2022-08-17
Payer: COMMERCIAL

## 2022-08-17 DIAGNOSIS — F41.1 GENERALIZED ANXIETY DISORDER: ICD-10-CM

## 2022-08-17 DIAGNOSIS — F43.12 PROLONGED POSTTRAUMATIC STRESS DISORDER: ICD-10-CM

## 2022-08-17 DIAGNOSIS — F90.9 ATTENTION DEFICIT DISORDER OF ADULT WITH HYPERACTIVITY: ICD-10-CM

## 2022-08-17 DIAGNOSIS — F34.1 PERSISTENT DEPRESSIVE DISORDER: ICD-10-CM

## 2022-08-17 PROCEDURE — 90837 PSYTX W PT 60 MINUTES: CPT | Performed by: PSYCHOLOGIST
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
## 2022-09-15 ENCOUNTER — APPOINTMENT (OUTPATIENT)
Dept: BEHAVIORAL HEALTH | Facility: CLINIC | Age: 22
End: 2022-09-15
Payer: COMMERCIAL

## 2023-03-18 ENCOUNTER — OFFICE VISIT (OUTPATIENT)
Dept: URGENT CARE | Facility: PHYSICIAN GROUP | Age: 23
End: 2023-03-18
Payer: COMMERCIAL

## 2023-03-18 VITALS
HEIGHT: 70 IN | BODY MASS INDEX: 25.62 KG/M2 | OXYGEN SATURATION: 97 % | WEIGHT: 179 LBS | DIASTOLIC BLOOD PRESSURE: 72 MMHG | HEART RATE: 96 BPM | TEMPERATURE: 98.8 F | RESPIRATION RATE: 14 BRPM | SYSTOLIC BLOOD PRESSURE: 106 MMHG

## 2023-03-18 DIAGNOSIS — J06.9 VIRAL URI: ICD-10-CM

## 2023-03-18 DIAGNOSIS — J02.9 PHARYNGITIS, UNSPECIFIED ETIOLOGY: ICD-10-CM

## 2023-03-18 LAB
FLUAV RNA SPEC QL NAA+PROBE: NEGATIVE
FLUBV RNA SPEC QL NAA+PROBE: NEGATIVE
RSV RNA SPEC QL NAA+PROBE: NEGATIVE
S PYO DNA SPEC NAA+PROBE: NOT DETECTED
SARS-COV-2 RNA RESP QL NAA+PROBE: NEGATIVE

## 2023-03-18 PROCEDURE — 0241U POCT CEPHEID COV-2, FLU A/B, RSV - PCR: CPT | Performed by: NURSE PRACTITIONER

## 2023-03-18 PROCEDURE — 99213 OFFICE O/P EST LOW 20 MIN: CPT | Performed by: NURSE PRACTITIONER

## 2023-03-18 PROCEDURE — 87651 STREP A DNA AMP PROBE: CPT | Performed by: NURSE PRACTITIONER

## 2023-03-18 ASSESSMENT — ENCOUNTER SYMPTOMS
SENSORY CHANGE: 0
HEADACHES: 1
RESPIRATORY NEGATIVE: 1
FEVER: 1
SORE THROAT: 1
WEAKNESS: 0
COUGH: 0
SHORTNESS OF BREATH: 0
CARDIOVASCULAR NEGATIVE: 1

## 2023-03-18 ASSESSMENT — VISUAL ACUITY: OU: 1

## 2023-03-18 ASSESSMENT — FIBROSIS 4 INDEX: FIB4 SCORE: 0.5

## 2023-03-18 NOTE — PROGRESS NOTES
"Subjective:     Clair Garrett is a 22 y.o. female who presents for Headache (X 1 day) and Pharyngitis (X 1 day)       Pharyngitis   This is a new problem. The current episode started yesterday. The problem has been gradually worsening. Associated symptoms include congestion, ear pain (Right) and headaches. Pertinent negatives include no coughing or shortness of breath.     Review of Systems   Constitutional:  Positive for fever and malaise/fatigue.   HENT:  Positive for congestion, ear pain (Right) and sore throat.    Respiratory: Negative.  Negative for cough and shortness of breath.    Cardiovascular: Negative.    Neurological:  Positive for headaches. Negative for sensory change and weakness.   All other systems reviewed and are negative.    Refer to HPI for additional details.    During this visit, appropriate PPE was worn, hand hygiene was performed, and the patient and any visitors were masked.    PMH:  has a past medical history of ADHD, ADHD (attention deficit hyperactivity disorder) (4/17/2011), Bipolar disorder ([facility]), Mood disorder ([facility]) (4/17/2011), and Sensory disorder.    MEDS: No current outpatient medications on file.    ALLERGIES: No Known Allergies  SURGHX:   Past Surgical History:   Procedure Laterality Date    TONSILLECTOMY       SOCHX:  reports that she has been smoking cigarettes. She started smoking about 9 years ago. She has been smoking an average of 1 pack per day. She has never used smokeless tobacco. She reports that she does not drink alcohol and does not use drugs.    FH: Per HPI as applicable/pertinent.      Objective:     /72 (BP Location: Left arm, Patient Position: Sitting, BP Cuff Size: Large adult)   Pulse 96   Temp 37.1 °C (98.8 °F)   Resp 14   Ht 1.778 m (5' 10\")   Wt 81.2 kg (179 lb)   SpO2 97%   BMI 25.68 kg/m²     Physical Exam  Nursing note reviewed.   Constitutional:       General: She is not in acute distress.     Appearance: She is well-developed. She " is not ill-appearing or toxic-appearing.   HENT:      Right Ear: Tympanic membrane normal.      Left Ear: Tympanic membrane normal.      Nose: Congestion present.      Mouth/Throat:      Mouth: Mucous membranes are moist.      Pharynx: Uvula midline. Pharyngeal swelling and posterior oropharyngeal erythema present.   Eyes:      General: Vision grossly intact.   Cardiovascular:      Rate and Rhythm: Normal rate.   Pulmonary:      Effort: Pulmonary effort is normal. No respiratory distress.      Comments: Phonation normal, speaks in full sentences, no audible wheeze or stridor   Musculoskeletal:         General: No deformity. Normal range of motion.   Skin:     General: Skin is warm and dry.      Coloration: Skin is not pale.   Neurological:      Mental Status: She is alert and oriented to person, place, and time.      Motor: No weakness.   Psychiatric:         Behavior: Behavior normal. Behavior is cooperative.     POCT Cepheid CoV-2, Flu A/B, RSV by PCR: negative    POCT Cepheid Group A Strep by PCR: negative       Assessment/Plan:     1. Pharyngitis, unspecified etiology  - POCT GROUP A STREP, PCR  - POCT CoV-2, Flu A/B, RSV by PCR    2. Viral URI    Discussed likely self-limiting viral etiology and expected course and duration of illness. Vital signs stable, afebrile, no acute distress at this time.     Differential diagnosis, natural history, supportive care, rest, fluids, gargles, over-the-counter symptom management per 's instructions, throat sprays, ibuprofen, APAP, close monitoring, and indications for immediate follow-up discussed.     Warning signs reviewed. Return precautions discussed.     All questions answered. Patient agrees with the plan of care.    Discharge summary provided.    Note provided.

## 2023-03-18 NOTE — LETTER
March 18, 2023         Patient: Clair Garrett   YOB: 2000   Date of Visit: 3/18/2023           To Whom it May Concern:    Clair Garrtet was seen in my clinic on 3/18/2023 due to illness.    Strep test pending. If positive, patient is contagious until on antibiotics for at least 24 hours.    COVID-19 PCR test pending. If positive, patient is advised to self-isolate as recommended by the CDC.    Children and adults with mild, symptomatic COVID-19: Isolation can end at least 5 days after symptom onset and after fever ends for 24 hours (without the use of fever-reducing medication) and symptoms are improving, if these people can continue to properly wear a well-fitted mask around others for 5 more days after the 5-day isolation period. Day 0 is the first day of symptoms.    Please visit https://www.cdc.gov/coronavirus/2019-ncov/hcp/duration-isolation.html for further information.     If you have any questions or concerns, please don't hesitate to call.        Sincerely,         SHIRA Denney.  Electronically Signed

## 2023-11-09 ENCOUNTER — OFFICE VISIT (OUTPATIENT)
Dept: URGENT CARE | Facility: PHYSICIAN GROUP | Age: 23
End: 2023-11-09
Payer: COMMERCIAL

## 2023-11-09 VITALS
WEIGHT: 200 LBS | HEART RATE: 83 BPM | BODY MASS INDEX: 30.31 KG/M2 | SYSTOLIC BLOOD PRESSURE: 108 MMHG | HEIGHT: 68 IN | RESPIRATION RATE: 16 BRPM | OXYGEN SATURATION: 97 % | DIASTOLIC BLOOD PRESSURE: 68 MMHG | TEMPERATURE: 98.4 F

## 2023-11-09 DIAGNOSIS — R09.81 NASAL CONGESTION: ICD-10-CM

## 2023-11-09 DIAGNOSIS — A08.4 VIRAL GASTROENTERITIS: ICD-10-CM

## 2023-11-09 DIAGNOSIS — Z76.89 REFERRAL OF PATIENT: ICD-10-CM

## 2023-11-09 DIAGNOSIS — R11.2 NAUSEA AND VOMITING, UNSPECIFIED VOMITING TYPE: ICD-10-CM

## 2023-11-09 DIAGNOSIS — R52 GENERALIZED BODY ACHES: ICD-10-CM

## 2023-11-09 LAB
FLUAV RNA SPEC QL NAA+PROBE: NEGATIVE
FLUBV RNA SPEC QL NAA+PROBE: NEGATIVE
RSV RNA SPEC QL NAA+PROBE: NEGATIVE
SARS-COV-2 RNA RESP QL NAA+PROBE: NEGATIVE

## 2023-11-09 PROCEDURE — 0241U POCT CEPHEID COV-2, FLU A/B, RSV - PCR: CPT | Performed by: STUDENT IN AN ORGANIZED HEALTH CARE EDUCATION/TRAINING PROGRAM

## 2023-11-09 PROCEDURE — 99213 OFFICE O/P EST LOW 20 MIN: CPT | Performed by: STUDENT IN AN ORGANIZED HEALTH CARE EDUCATION/TRAINING PROGRAM

## 2023-11-09 PROCEDURE — 3078F DIAST BP <80 MM HG: CPT | Performed by: STUDENT IN AN ORGANIZED HEALTH CARE EDUCATION/TRAINING PROGRAM

## 2023-11-09 PROCEDURE — 3074F SYST BP LT 130 MM HG: CPT | Performed by: STUDENT IN AN ORGANIZED HEALTH CARE EDUCATION/TRAINING PROGRAM

## 2023-11-09 RX ORDER — ONDANSETRON 4 MG/1
4 TABLET, FILM COATED ORAL EVERY 4 HOURS PRN
Qty: 20 TABLET | Refills: 0 | Status: SHIPPED | OUTPATIENT
Start: 2023-11-09 | End: 2024-01-09

## 2023-11-09 ASSESSMENT — ENCOUNTER SYMPTOMS
CONSTIPATION: 0
VOMITING: 1
NAUSEA: 1
HEADACHES: 0
DIARRHEA: 1
FEVER: 1
DIZZINESS: 0
WHEEZING: 0
COUGH: 0
PALPITATIONS: 0
FATIGUE: 0
MYALGIAS: 1
SORE THROAT: 0
CHILLS: 0
ABDOMINAL PAIN: 0
SHORTNESS OF BREATH: 0

## 2023-11-09 NOTE — LETTER
November 9, 2023    To Whom It May Concern:         This is confirmation that Clair Garrett attended her scheduled appointment with Sabrina Amaral P.A.-C. on 11/09/23.  Please excuse work absences for 11/08/23 through 11/09/23 for medical reasons.         If you have any questions please do not hesitate to call me at the phone number listed below.    Sincerely,    Sabrina Amaral P.A.-C.  [phone]

## 2023-11-09 NOTE — PROGRESS NOTES
"Subjective     Clairshari Garrett is a 23 y.o. female who presents with Nausea (Diarrhea,stomach pain x 4 days)            Clair is a 23 y.o. female who presents to urgent care with nausea, vomiting, diarrhea, body aches and fatigue.  Symptoms started approximately 4 days ago.  Patient states symptoms started on Monday and worsened Tuesday morning.  Patient states her mom recently had a stomach bug and had similar symptoms.  Patient reports 2-3 episodes of diarrhea a day as well as up symptoms of nausea/vomiting.  Patient does report nasal congestion and bilateral ear pain.  No sore throat or cough.  No shortness of breath/wheezing.  Patient requesting note for work.    Nausea  This is a new problem. Episode onset: 4 days ago. The problem has been unchanged. Associated symptoms include congestion, a fever, myalgias, nausea and vomiting. Pertinent negatives include no abdominal pain, chest pain, chills, coughing, fatigue, headaches, sore throat or urinary symptoms.       Review of Systems   Constitutional:  Positive for fever and malaise/fatigue. Negative for chills and fatigue.   HENT:  Positive for congestion and ear pain. Negative for sore throat.    Respiratory:  Negative for cough, shortness of breath and wheezing.    Cardiovascular:  Negative for chest pain and palpitations.   Gastrointestinal:  Positive for diarrhea, nausea and vomiting. Negative for abdominal pain and constipation.   Musculoskeletal:  Positive for myalgias.   Neurological:  Negative for dizziness and headaches.   All other systems reviewed and are negative.             Objective     /68 (BP Location: Right arm, Patient Position: Sitting, BP Cuff Size: Adult)   Pulse 83   Temp 36.9 °C (98.4 °F) (Temporal)   Resp 16   Ht 1.727 m (5' 8\")   Wt 90.7 kg (200 lb)   SpO2 97%   BMI 30.41 kg/m²      Physical Exam  Vitals reviewed.   Constitutional:       General: She is not in acute distress.     Appearance: Normal appearance. She " is not ill-appearing, toxic-appearing or diaphoretic.   HENT:      Head: Normocephalic and atraumatic.      Right Ear: Tympanic membrane, ear canal and external ear normal.      Left Ear: Tympanic membrane, ear canal and external ear normal.      Nose: Congestion present.      Mouth/Throat:      Mouth: Mucous membranes are moist.      Pharynx: Oropharynx is clear.   Eyes:      Extraocular Movements: Extraocular movements intact.      Conjunctiva/sclera: Conjunctivae normal.      Pupils: Pupils are equal, round, and reactive to light.   Cardiovascular:      Rate and Rhythm: Normal rate and regular rhythm.   Pulmonary:      Effort: Pulmonary effort is normal.      Breath sounds: Normal breath sounds.   Abdominal:      General: Abdomen is flat. Bowel sounds are normal. There is no distension.      Palpations: Abdomen is soft.      Tenderness: There is no abdominal tenderness. There is no guarding or rebound.   Skin:     General: Skin is warm and dry.   Neurological:      General: No focal deficit present.      Mental Status: She is alert. Mental status is at baseline.                             Assessment & Plan          1. Viral gastroenteritis    2. Nausea and vomiting, unspecified vomiting type  - ondansetron (ZOFRAN) 4 MG Tab tablet; Take 1 Tablet by mouth every four hours as needed for Nausea/Vomiting for up to 20 doses.  Dispense: 20 Tablet; Refill: 0    3. Generalized body aches  - POCT CoV-2, Flu A/B, RSV by PCR    4. Nasal congestion    Differential diagnoses, supportive care measures (rest, hydration, OTC Tylenol/ibuprofen, bland diet, OTC Pepto-Bismol, nasal saline rinses, minified air) and indications for immediate follow-up discussed with patient. Pathogenesis of diagnosis discussed including typical length and natural progression.  Strict ER precautions discussed.    Instructed to return to urgent care or nearest emergency department if symptoms fail to improve, for any change in condition, further  concerns, or new concerning symptoms.    Patient states understanding and agrees with the plan of care and discharge instructions.

## 2023-11-14 ENCOUNTER — TELEPHONE (OUTPATIENT)
Dept: HEALTH INFORMATION MANAGEMENT | Facility: OTHER | Age: 23
End: 2023-11-14
Payer: COMMERCIAL

## 2023-11-16 ENCOUNTER — OFFICE VISIT (OUTPATIENT)
Dept: URGENT CARE | Facility: PHYSICIAN GROUP | Age: 23
End: 2023-11-16
Payer: COMMERCIAL

## 2023-11-16 VITALS
TEMPERATURE: 97.6 F | OXYGEN SATURATION: 97 % | RESPIRATION RATE: 16 BRPM | HEART RATE: 84 BPM | WEIGHT: 200 LBS | BODY MASS INDEX: 30.31 KG/M2 | DIASTOLIC BLOOD PRESSURE: 82 MMHG | SYSTOLIC BLOOD PRESSURE: 100 MMHG | HEIGHT: 68 IN

## 2023-11-16 DIAGNOSIS — R50.9 FEVER, UNSPECIFIED FEVER CAUSE: ICD-10-CM

## 2023-11-16 DIAGNOSIS — J02.0 STREP THROAT: Primary | ICD-10-CM

## 2023-11-16 DIAGNOSIS — J02.9 SORE THROAT: ICD-10-CM

## 2023-11-16 DIAGNOSIS — R11.2 NAUSEA AND VOMITING, UNSPECIFIED VOMITING TYPE: ICD-10-CM

## 2023-11-16 LAB
FLUAV RNA SPEC QL NAA+PROBE: NEGATIVE
FLUBV RNA SPEC QL NAA+PROBE: NEGATIVE
RSV RNA SPEC QL NAA+PROBE: NEGATIVE
S PYO DNA SPEC NAA+PROBE: DETECTED
SARS-COV-2 RNA RESP QL NAA+PROBE: NEGATIVE

## 2023-11-16 PROCEDURE — 0241U POCT CEPHEID COV-2, FLU A/B, RSV - PCR: CPT | Performed by: PHYSICIAN ASSISTANT

## 2023-11-16 PROCEDURE — 87651 STREP A DNA AMP PROBE: CPT | Performed by: PHYSICIAN ASSISTANT

## 2023-11-16 PROCEDURE — 3074F SYST BP LT 130 MM HG: CPT | Performed by: PHYSICIAN ASSISTANT

## 2023-11-16 PROCEDURE — 3079F DIAST BP 80-89 MM HG: CPT | Performed by: PHYSICIAN ASSISTANT

## 2023-11-16 PROCEDURE — 99213 OFFICE O/P EST LOW 20 MIN: CPT | Performed by: PHYSICIAN ASSISTANT

## 2023-11-16 ASSESSMENT — ENCOUNTER SYMPTOMS
FEVER: 0
DIARRHEA: 1
TROUBLE SWALLOWING: 0
VOMITING: 1
COUGH: 1
SHORTNESS OF BREATH: 0
NAUSEA: 1
SORE THROAT: 1

## 2023-11-16 ASSESSMENT — PAIN SCALES - GENERAL: PAINLEVEL: 7=MODERATE-SEVERE PAIN

## 2023-11-16 NOTE — LETTER
November 16, 2023         Patient: Clair Garrett   YOB: 2000   Date of Visit: 11/16/2023           To Whom it May Concern:    Clair Garrett was seen in my clinic on 11/16/2023. She may return to work on 11/18/2023 or sooner if condition improves sooner.   .    If you have any questions or concerns, please don't hesitate to call.        Sincerely,           Carolina Steward P.A.-C.  Electronically Signed

## 2023-11-16 NOTE — PROGRESS NOTES
Subjective     Clair Garrett is a 23 y.o. female who presents with GI Problem (Nausea  and diarrhea has gotten worse /Fevers on and off since last visit as well  Ibuprofen taken today at 11.48 am ) and Pharyngitis (X 2 days )    PMH:  has a past medical history of ADHD, ADHD (attention deficit hyperactivity disorder) (4/17/2011), Bipolar disorder (HCC), Mood disorder (HCC) (4/17/2011), and Sensory disorder.  MEDS:   Current Outpatient Medications:     ondansetron (ZOFRAN) 4 MG Tab tablet, Take 1 Tablet by mouth every four hours as needed for Nausea/Vomiting for up to 20 doses. (Patient not taking: Reported on 11/16/2023), Disp: 20 Tablet, Rfl: 0  ALLERGIES: No Known Allergies  SURGHX:   Past Surgical History:   Procedure Laterality Date    TONSILLECTOMY       SOCHX:  reports that she has been smoking cigarettes. She started smoking about 9 years ago. She has a 9.7 pack-year smoking history. She has never used smokeless tobacco. She reports that she does not drink alcohol and does not use drugs.  FH: Reviewed with patient, not pertinent to this visit.           Patient presents with sore throat x 2 days, fever, nausea and diarrhea for the last week.  PT is not sure but she thinks she may have strep throat as she has worsening sore throat the last 2 days. PT denies any other complaint.  She also needs note for work.                Pharyngitis   This is a new problem. The current episode started yesterday. The problem has been gradually worsening. Neither side of throat is experiencing more pain than the other. The maximum temperature recorded prior to her arrival was 101 - 101.9 F. The fever has been present for 1 to 2 days. The pain is moderate. Associated symptoms include coughing, diarrhea and vomiting. Pertinent negatives include no shortness of breath or trouble swallowing. She has tried cool liquids, NSAIDs and gargles for the symptoms. The treatment provided mild relief.       Review of Systems  "  Constitutional:  Negative for fever.   HENT:  Positive for sore throat. Negative for trouble swallowing.    Respiratory:  Positive for cough. Negative for shortness of breath.    Gastrointestinal:  Positive for diarrhea, nausea and vomiting.   All other systems reviewed and are negative.             Objective     /82 (BP Location: Right arm, Patient Position: Sitting, BP Cuff Size: Adult)   Pulse 84   Temp 36.4 °C (97.6 °F) (Temporal)   Resp 16   Ht 1.727 m (5' 8\")   Wt 90.7 kg (200 lb) Comment: reported  SpO2 97%   BMI 30.41 kg/m²      Physical Exam  Vitals and nursing note reviewed.   Constitutional:       General: She is not in acute distress.     Appearance: Normal appearance. She is well-developed. She is not toxic-appearing.   HENT:      Head: Normocephalic and atraumatic.      Right Ear: Tympanic membrane normal.      Left Ear: Tympanic membrane normal.      Nose: Nose normal.      Mouth/Throat:      Lips: Pink.      Mouth: Mucous membranes are moist.      Pharynx: Posterior oropharyngeal erythema present. No oropharyngeal exudate.   Eyes:      Extraocular Movements: Extraocular movements intact.      Conjunctiva/sclera: Conjunctivae normal.      Pupils: Pupils are equal, round, and reactive to light.   Cardiovascular:      Rate and Rhythm: Normal rate and regular rhythm.      Heart sounds: Normal heart sounds.   Pulmonary:      Effort: Pulmonary effort is normal.      Breath sounds: Normal breath sounds.   Abdominal:      Palpations: Abdomen is soft.   Musculoskeletal:         General: Normal range of motion.      Cervical back: Normal range of motion and neck supple.   Lymphadenopathy:      Cervical: No cervical adenopathy.   Skin:     General: Skin is warm and dry.      Capillary Refill: Capillary refill takes less than 2 seconds.   Neurological:      General: No focal deficit present.      Mental Status: She is alert and oriented to person, place, and time.      Gait: Gait normal. "   Psychiatric:         Mood and Affect: Mood normal.         Behavior: Behavior is cooperative.                             Assessment & Plan                1. Strep throat        2. Sore throat  POCT CEPHEID GROUP A STREP - PCR    POCT CEPHEID COV-2, FLU A/B, RSV - PCR      3. Fever, unspecified fever cause  POCT CEPHEID GROUP A STREP - PCR    POCT CEPHEID COV-2, FLU A/B, RSV - PCR      4. Nausea and vomiting, unspecified vomiting type  POCT CEPHEID GROUP A STREP - PCR    POCT CEPHEID COV-2, FLU A/B, RSV - PCR        Strep: positive  Covid/flu/rsv: negative    Strep test is positive in clinic so will send Rx for amoxicillin BID x 10 days.    Motrin/Advil/Ibuprophen 600 mg every 6 hours as needed for pain or fever.    PT advised saltwater gargles/swishes  3-4 times daily until symptoms improve.     Differential diagnosis, supportive care, and indications for immediate follow-up discussed with patient.  Instructed to return to clinic or nearest emergency department for any change in condition, further concerns, or worsening of symptoms.    I personally reviewed prior external notes and test results pertinent to today's visit.  I have independently reviewed and interpreted all diagnostics ordered during this urgent care visit.    PT should follow up with PCP in 1-2 days for re-evaluation if symptoms have not improved.      Discussed red flags and reasons to return to  or ED.      Pt and/or family verbalized understanding of diagnosis and follow up instructions and was offered informational handout on diagnosis.  PT discharged.     Please note that this dictation was created using voice recognition software. I have made every reasonable attempt to correct obvious errors, but I expect that there may be errors of grammar and possibly content that I did not discover before finalizing the note.

## 2023-11-16 NOTE — LETTER
November 16, 2023         Patient: Clair Garrett   YOB: 2000   Date of Visit: 11/16/2023           To Whom it May Concern:    Clair Garrett was seen in my clinic on 11/16/2023. She {Return to school/sport/work:10979}    If you have any questions or concerns, please don't hesitate to call.        Sincerely,           Carolina Steward P.A.-C.  Electronically Signed

## 2023-11-17 DIAGNOSIS — J02.0 ACUTE STREPTOCOCCAL PHARYNGITIS: ICD-10-CM

## 2023-11-17 RX ORDER — AMOXICILLIN 500 MG/1
500 CAPSULE ORAL 2 TIMES DAILY
Qty: 20 CAPSULE | Refills: 0 | Status: SHIPPED | OUTPATIENT
Start: 2023-11-17 | End: 2023-11-27

## 2023-11-17 NOTE — PROGRESS NOTES
Patient did test positive for strep yesterday, medication was not sent to the pharmacy.  Did send prescription for appropriate treatment of strep test.  Patient notified of prescription being sent.    Jluis Null PA-C

## 2023-12-08 ENCOUNTER — APPOINTMENT (OUTPATIENT)
Dept: RADIOLOGY | Facility: MEDICAL CENTER | Age: 23
End: 2023-12-08
Attending: EMERGENCY MEDICINE
Payer: COMMERCIAL

## 2023-12-08 ENCOUNTER — HOSPITAL ENCOUNTER (EMERGENCY)
Facility: MEDICAL CENTER | Age: 23
End: 2023-12-08
Attending: EMERGENCY MEDICINE
Payer: COMMERCIAL

## 2023-12-08 ENCOUNTER — OFFICE VISIT (OUTPATIENT)
Dept: URGENT CARE | Facility: PHYSICIAN GROUP | Age: 23
End: 2023-12-08
Payer: COMMERCIAL

## 2023-12-08 VITALS
WEIGHT: 202.16 LBS | DIASTOLIC BLOOD PRESSURE: 68 MMHG | SYSTOLIC BLOOD PRESSURE: 124 MMHG | HEIGHT: 69 IN | TEMPERATURE: 97.6 F | RESPIRATION RATE: 16 BRPM | HEART RATE: 68 BPM | OXYGEN SATURATION: 93 % | BODY MASS INDEX: 29.94 KG/M2

## 2023-12-08 VITALS
DIASTOLIC BLOOD PRESSURE: 82 MMHG | SYSTOLIC BLOOD PRESSURE: 122 MMHG | HEART RATE: 82 BPM | RESPIRATION RATE: 16 BRPM | HEIGHT: 69 IN | BODY MASS INDEX: 30.07 KG/M2 | WEIGHT: 203 LBS | TEMPERATURE: 98.5 F | OXYGEN SATURATION: 97 %

## 2023-12-08 DIAGNOSIS — R06.00 DYSPNEA, UNSPECIFIED TYPE: ICD-10-CM

## 2023-12-08 DIAGNOSIS — R42 DIZZINESS: ICD-10-CM

## 2023-12-08 DIAGNOSIS — R07.9 CHEST PAIN, UNSPECIFIED TYPE: ICD-10-CM

## 2023-12-08 DIAGNOSIS — R42 LIGHTHEADEDNESS: ICD-10-CM

## 2023-12-08 DIAGNOSIS — G89.29 CHRONIC NONINTRACTABLE HEADACHE, UNSPECIFIED HEADACHE TYPE: ICD-10-CM

## 2023-12-08 DIAGNOSIS — R51.9 CHRONIC NONINTRACTABLE HEADACHE, UNSPECIFIED HEADACHE TYPE: ICD-10-CM

## 2023-12-08 DIAGNOSIS — R52 BODY ACHES: ICD-10-CM

## 2023-12-08 LAB
ALBUMIN SERPL BCP-MCNC: 4.2 G/DL (ref 3.2–4.9)
ALBUMIN/GLOB SERPL: 2.2 G/DL
ALP SERPL-CCNC: 41 U/L (ref 30–99)
ALT SERPL-CCNC: 14 U/L (ref 2–50)
ANION GAP SERPL CALC-SCNC: 10 MMOL/L (ref 7–16)
AST SERPL-CCNC: 19 U/L (ref 12–45)
BASOPHILS # BLD AUTO: 1.4 % (ref 0–1.8)
BASOPHILS # BLD: 0.06 K/UL (ref 0–0.12)
BILIRUB SERPL-MCNC: 0.4 MG/DL (ref 0.1–1.5)
BUN SERPL-MCNC: 6 MG/DL (ref 8–22)
CALCIUM ALBUM COR SERPL-MCNC: 8.3 MG/DL (ref 8.5–10.5)
CALCIUM SERPL-MCNC: 8.5 MG/DL (ref 8.5–10.5)
CHLORIDE SERPL-SCNC: 107 MMOL/L (ref 96–112)
CO2 SERPL-SCNC: 23 MMOL/L (ref 20–33)
CREAT SERPL-MCNC: 0.54 MG/DL (ref 0.5–1.4)
EKG IMPRESSION: NORMAL
EOSINOPHIL # BLD AUTO: 0.04 K/UL (ref 0–0.51)
EOSINOPHIL NFR BLD: 0.9 % (ref 0–6.9)
ERYTHROCYTE [DISTWIDTH] IN BLOOD BY AUTOMATED COUNT: 36.3 FL (ref 35.9–50)
GFR SERPLBLD CREATININE-BSD FMLA CKD-EPI: 133 ML/MIN/1.73 M 2
GLOBULIN SER CALC-MCNC: 1.9 G/DL (ref 1.9–3.5)
GLUCOSE SERPL-MCNC: 90 MG/DL (ref 65–99)
HCG SERPL QL: NEGATIVE
HCT VFR BLD AUTO: 43.6 % (ref 37–47)
HGB BLD-MCNC: 15.7 G/DL (ref 12–16)
IMM GRANULOCYTES # BLD AUTO: 0.01 K/UL (ref 0–0.11)
IMM GRANULOCYTES NFR BLD AUTO: 0.2 % (ref 0–0.9)
LYMPHOCYTES # BLD AUTO: 1.9 K/UL (ref 1–4.8)
LYMPHOCYTES NFR BLD: 45 % (ref 22–41)
MCH RBC QN AUTO: 30.4 PG (ref 27–33)
MCHC RBC AUTO-ENTMCNC: 36 G/DL (ref 32.2–35.5)
MCV RBC AUTO: 84.3 FL (ref 81.4–97.8)
MONOCYTES # BLD AUTO: 0.23 K/UL (ref 0–0.85)
MONOCYTES NFR BLD AUTO: 5.5 % (ref 0–13.4)
NEUTROPHILS # BLD AUTO: 1.98 K/UL (ref 1.82–7.42)
NEUTROPHILS NFR BLD: 47 % (ref 44–72)
NRBC # BLD AUTO: 0 K/UL
NRBC BLD-RTO: 0 /100 WBC (ref 0–0.2)
PLATELET # BLD AUTO: 301 K/UL (ref 164–446)
PMV BLD AUTO: 9.5 FL (ref 9–12.9)
POTASSIUM SERPL-SCNC: 4.4 MMOL/L (ref 3.6–5.5)
PROT SERPL-MCNC: 6.1 G/DL (ref 6–8.2)
RBC # BLD AUTO: 5.17 M/UL (ref 4.2–5.4)
SODIUM SERPL-SCNC: 140 MMOL/L (ref 135–145)
TROPONIN T SERPL-MCNC: <6 NG/L (ref 6–19)
WBC # BLD AUTO: 4.2 K/UL (ref 4.8–10.8)

## 2023-12-08 PROCEDURE — 99214 OFFICE O/P EST MOD 30 MIN: CPT | Performed by: PHYSICIAN ASSISTANT

## 2023-12-08 PROCEDURE — 93005 ELECTROCARDIOGRAM TRACING: CPT

## 2023-12-08 PROCEDURE — 3074F SYST BP LT 130 MM HG: CPT | Performed by: PHYSICIAN ASSISTANT

## 2023-12-08 PROCEDURE — 85025 COMPLETE CBC W/AUTO DIFF WBC: CPT

## 2023-12-08 PROCEDURE — 99284 EMERGENCY DEPT VISIT MOD MDM: CPT

## 2023-12-08 PROCEDURE — A9270 NON-COVERED ITEM OR SERVICE: HCPCS | Performed by: EMERGENCY MEDICINE

## 2023-12-08 PROCEDURE — 84484 ASSAY OF TROPONIN QUANT: CPT

## 2023-12-08 PROCEDURE — 80053 COMPREHEN METABOLIC PANEL: CPT

## 2023-12-08 PROCEDURE — 36415 COLL VENOUS BLD VENIPUNCTURE: CPT

## 2023-12-08 PROCEDURE — 84703 CHORIONIC GONADOTROPIN ASSAY: CPT

## 2023-12-08 PROCEDURE — 70450 CT HEAD/BRAIN W/O DYE: CPT

## 2023-12-08 PROCEDURE — 3079F DIAST BP 80-89 MM HG: CPT | Performed by: PHYSICIAN ASSISTANT

## 2023-12-08 PROCEDURE — 93000 ELECTROCARDIOGRAM COMPLETE: CPT | Performed by: PHYSICIAN ASSISTANT

## 2023-12-08 PROCEDURE — 93005 ELECTROCARDIOGRAM TRACING: CPT | Performed by: EMERGENCY MEDICINE

## 2023-12-08 PROCEDURE — 700102 HCHG RX REV CODE 250 W/ 637 OVERRIDE(OP): Performed by: EMERGENCY MEDICINE

## 2023-12-08 PROCEDURE — 71045 X-RAY EXAM CHEST 1 VIEW: CPT

## 2023-12-08 RX ORDER — SUMATRIPTAN 25 MG/1
50 TABLET, FILM COATED ORAL
Qty: 10 TABLET | Refills: 2 | Status: SHIPPED | OUTPATIENT
Start: 2023-12-08

## 2023-12-08 RX ORDER — SUMATRIPTAN 50 MG/1
50 TABLET, FILM COATED ORAL ONCE
Status: COMPLETED | OUTPATIENT
Start: 2023-12-08 | End: 2023-12-08

## 2023-12-08 RX ADMIN — SUMATRIPTAN SUCCINATE 50 MG: 50 TABLET ORAL at 11:24

## 2023-12-08 ASSESSMENT — PAIN DESCRIPTION - PAIN TYPE
TYPE: ACUTE PAIN
TYPE: ACUTE PAIN

## 2023-12-08 ASSESSMENT — FIBROSIS 4 INDEX: FIB4 SCORE: 0.37

## 2023-12-08 NOTE — ED TRIAGE NOTES
Chief Complaint   Patient presents with    Chest Pain     Pt reports while working on 12/6 she got really bad L sided chest pain.      Pt ambulatory to triage for above complaint. Pt reports increase in dizziness when changing positions. Pt reports feeling palpitations intermittently. Pt took ibuprofen prior to coming to ED this morning.

## 2023-12-08 NOTE — ED PROVIDER NOTES
ED Provider Note    Primary care provider: none  Means of arrival: private vehicle  History obtained from: patient  History limited by: none    CHIEF COMPLAINT  Chief Complaint   Patient presents with    Chest Pain     Pt reports while working on 12/6 she got really bad L sided chest pain.        HPI/DAVID  Clairdimple Garrett is a 23 y.o. female who presents to the Emergency Department for evaluation of chest pain, headache and blurred vision.  Patient reports for the past 2 days she has had headaches, blurred vision, lightheadedness and chest pain.  She states that she was just at work when her symptoms started, cannot think of any acute event that caused her symptoms.  She states that her symptoms wax and wane in severity.  Her primary complaint is diffuse throbbing headache.  She states that she does have a history of headaches has never followed up with a primary care physician for them but does believe she has migraines.  She states that she gets a headache approximately every other day.  Typically they are resolved with Motrin.  However this morning she took Motrin and did not feel improved and therefore came in for further evaluation.  With the headache she has associated chest pain, lightheadedness, intermittent blurred vision.  No family history of brain abnormalities or clotting disorders.  Patient is not on any hormone therapy.  No recent surgery, no history of DVT or PE.  No underlying cardiac history.    Patient does not take any daily medications.  She does smoke cigarettes, marijuana and vapes.  She has generalized anxiety disorder.    EXTERNAL RECORDS REVIEWED  Patient is followed outpatient by psychiatry, last visit was on 8/17/2022 in our system.    LIMITATION TO HISTORY   none    PAST MEDICAL HISTORY   has a past medical history of ADHD, ADHD (attention deficit hyperactivity disorder) (4/17/2011), Bipolar disorder ([facility]), Mood disorder (HCC) (4/17/2011), and Sensory disorder.    SURGICAL  "HISTORY   has a past surgical history that includes tonsillectomy.    SOCIAL HISTORY  Social History     Tobacco Use    Smoking status: Every Day     Current packs/day: 1.00     Average packs/day: 1 pack/day for 9.7 years (9.7 ttl pk-yrs)     Types: Cigarettes     Start date: 3/20/2014    Smokeless tobacco: Never   Vaping Use    Vaping Use: Every day    Substances: Nicotine    Devices: Pre-filled or refillable cartridge, Refillable tank   Substance Use Topics    Alcohol use: No     Comment: rarely    Drug use: Yes     Comment: marijuana      Social History     Substance and Sexual Activity   Drug Use Yes    Comment: marijuana       FAMILY HISTORY  History reviewed. No pertinent family history.    CURRENT MEDICATIONS  Home Medications       Reviewed by Joyce Luke R.N. (Registered Nurse) on 12/08/23 at 1023  Med List Status: Not Addressed     Medication Last Dose Status   ondansetron (ZOFRAN) 4 MG Tab tablet  Active                    ALLERGIES  No Known Allergies    PHYSICAL EXAM  VITAL SIGNS: /78   Pulse 74   Temp 36.6 °C (97.9 °F) (Temporal)   Resp 16   Ht 1.753 m (5' 9\")   Wt 91.7 kg (202 lb 2.6 oz)   LMP 12/01/2023   SpO2 97% Comment: RA  BMI 29.85 kg/m²   Vitals reviewed by myself.  Physical Exam  Nursing note and vitals reviewed.  Constitutional: Well-developed and well-nourished. No distress.   HENT: Head is normocephalic and atraumatic. Oropharynx is clear and moist without exudate or erythema.   Eyes: Pupils are equal, round, and reactive to light. No horizontal or vertical nystagmus. Conjunctiva are normal.   Cardiovascular: Normal rate and regular rhythm. No murmur heard. Normal radial pulses.  Pulmonary/Chest: Breath sounds normal. No wheezes or rales.   Musculoskeletal: Extremities exhibit normal range of motion without edema or tenderness. Patient ambulates with a normal narrow-based steady gait.   Neurological: Awake, alert and oriented to person, place, and time. No focal deficits " noted. Cranial nerves II - XII intact. No pronator drift.  No dysmetria on cerebellar testing. Normal speech and language. Normal strength and sensation in bilateral upper and lower extremities.   Skin: Skin is warm and dry. No rash.   Psychiatric: Normal mood and affect. Appropriate for clinical situation.      DIAGNOSTIC STUDIES:  LABS  Labs Reviewed   CBC WITH DIFFERENTIAL - Abnormal; Notable for the following components:       Result Value    WBC 4.2 (*)     MCHC 36.0 (*)     Lymphocytes 45.00 (*)     All other components within normal limits    Narrative:     Biotin intake of greater than 5 mg per day may interfere with  troponin levels, causing false low values.   COMP METABOLIC PANEL - Abnormal; Notable for the following components:    Bun 6 (*)     Correct Calcium 8.3 (*)     All other components within normal limits    Narrative:     Biotin intake of greater than 5 mg per day may interfere with  troponin levels, causing false low values.   TROPONIN    Narrative:     Biotin intake of greater than 5 mg per day may interfere with  troponin levels, causing false low values.   HCG QUAL SERUM    Narrative:     Biotin intake of greater than 5 mg per day may interfere with  troponin levels, causing false low values.   ESTIMATED GFR    Narrative:     Biotin intake of greater than 5 mg per day may interfere with  troponin levels, causing false low values.       All labs reviewed and independently interpreted by myself    EKG Interpretation:    12 Lead EKG independently interpreted by myself to show:  EKG at 10:17 AM: Normal sinus rhythm, heart rate 65, normal axis, normal intervals, , QRS 96, QTc 392, no acute ST-T segment changes, no evidence of acute arrhythmia or ischemia per my independent interpretation    RADIOLOGY  Images independently interpreted by myself prior to radiologist review:  - Chest x-ray demonstrates no acute cardiopulmonary processes    Final interpretation by radiology  demonstrates:    CT-HEAD W/O   Final Result      Head CT without contrast within normal limits. No evidence of acute cerebral infarction, hemorrhage or mass lesion.         DX-CHEST-PORTABLE (1 VIEW)   Final Result      No acute cardiopulmonary disease evident.        The radiologist's interpretation of all radiological studies have been reviewed by me.      COURSE & MEDICAL DECISION MAKING    ED Observation Status? No; Patient does not meet criteria for ED Observation.     INITIAL ASSESSMENT AND PLAN    Patient is a 23-year-old female who presents for evaluation of headache, chest pain and lightheadedness.  Differential diagnosis includes tension headache, migraine headache, arrhythmia, electrolyte derangement, dehydration, generalized anxiety disorder.  Patient has a history of chronic headaches has never had any advanced imaging of her head and therefore will obtain a CT head to ensure no intracranial abnormality causing her headaches.  However at this time low suspicion for intracranial abnormality as she is neurologically intact.  She is not on any hormone therapy and therefore I have low suspicion for central venous thrombosis, no indication for vascular imaging of the head at this time.  Patient is PERC negative making pulmonary embolism unlikely therefore further workup for this not indicated.  Patient has low risk factors for ACS, as she is having chest pain with her symptoms will obtain a screening troponin, symptoms have been ongoing for over 2 days, if 1 troponin is negative this is sufficient to assess for ACS at this time.    ER COURSE AND FINAL DISPOSITION   Patient's initial vitals are within normal limits.  She is neurologically intact on exam.  EKG returns and demonstrates no evidence of acute arrhythmia or ischemia.  Patient is treated with Imitrex for her headache.  Labs returned and are independently interpreted by myself to demonstrate:  -Troponin is within normal limits, symptoms have been  ongoing for 2 days, repeat troponin not indicated as symptoms have been greater than 6 hours.  Patient has a heart score of 1 making her low risk for ACS, patient can follow-up further outpatient if she has ongoing symptoms  -hCG is negative, patient is not pregnant  -Electrolytes and renal function are within normal limits  -Labs otherwise unremarkable    CT of the head returns and demonstrates no acute intracranial abnormality.  At this time patient is advised that workup is reassuring.  She is feeling improved after receiving Imitrex.  Therefore she is advised to follow-up with primary care physician, at this time she does not have 1 and therefore referrals placed by myself.  Follow-up will be to see how she is responding to medications for her migraines and to follow-up if she has ongoing chest pain.  She is amenable to this plan.  She is then given strict return precautions and discharged in stable condition.    ADDITIONAL PROBLEM LIST AND RESOURCE UTILIZATION    Additional problems aside from the chief complaint that I have addressed: smoking cessation    I have discussed management of the patient with the following physicians and SHAYNA's: none    Discussion of management with other QHP or appropriate source(s): none     Escalation of care considered, and ultimately not performed: see above.     Barriers to care at this time, including but not limited to: Patient does not have established PCP.     Decision tools and prescription drugs considered including, but not limited to: PERC rule negative and HEART Score 1 for smoking .    Please see review of records as noted above      FINAL IMPRESSION  1. Chronic nonintractable headache, unspecified headache type    2. Chest pain, unspecified type    3. Lightheadedness

## 2023-12-09 NOTE — PROGRESS NOTES
"Subjective:   Clair Garrett is a 23 y.o. female who presents for Body Aches (X2 days: Body aches, dizziness when standing, chest pain)    Visit during downtime  ** See scanned note in media    HPI      Past Medical History:   Diagnosis Date    ADHD     ADHD (attention deficit hyperactivity disorder) 4/17/2011    Bipolar disorder (HCC)     Mood disorder (HCC) 4/17/2011    Sensory disorder      No Known Allergies     Objective:     /82   Pulse 82   Temp 36.9 °C (98.5 °F)   Resp 16   Ht 1.753 m (5' 9\")   Wt 92.1 kg (203 lb)   LMP 12/01/2023   SpO2 97%   BMI 29.98 kg/m²     Physical Exam            EKG Interpretation:  Interpreted by me    Rhythm:  Normal sinus rhythm   Rate: 67  Axis: QRS 83  Ectopy: none  Conduction: normal  ST Segments: no acute change  T Waves: no acute change  Q Waves: none  Clinical Impression: Normal EKG without acute changes     Diagnosis and associated orders:     1. Chest pain, unspecified type    2. Dizziness    3. Body aches    4. Dyspnea, unspecified type       Comments/MDM:     Transfer to ER. See scanned note in media due to downtime.        Patient expresses understanding and agrees to plan. Patient denies any other questions or concerns.         Please note that this dictation was created using voice recognition software. I have made a reasonable attempt to correct obvious errors, but I expect that there are errors of grammar and possibly content that I did not discover before finalizing the note.    This note was electronically signed by Bola Gutierrez PA-C    "

## 2024-01-09 ENCOUNTER — OFFICE VISIT (OUTPATIENT)
Dept: MEDICAL GROUP | Facility: MEDICAL CENTER | Age: 24
End: 2024-01-09
Attending: EMERGENCY MEDICINE
Payer: COMMERCIAL

## 2024-01-09 DIAGNOSIS — F90.9 ATTENTION DEFICIT HYPERACTIVITY DISORDER (ADHD), UNSPECIFIED ADHD TYPE: ICD-10-CM

## 2024-01-09 DIAGNOSIS — Z02.82 ADOPTED PERSON: ICD-10-CM

## 2024-01-09 DIAGNOSIS — Z23 NEED FOR IMMUNIZATION AGAINST INFLUENZA: ICD-10-CM

## 2024-01-09 DIAGNOSIS — F31.81 BIPOLAR 2 DISORDER (HCC): ICD-10-CM

## 2024-01-09 PROBLEM — Z78.9 ADOPTED PERSON: Status: ACTIVE | Noted: 2024-01-09

## 2024-01-09 PROBLEM — F41.1 GENERALIZED ANXIETY DISORDER: Status: RESOLVED | Noted: 2022-05-26 | Resolved: 2024-01-09

## 2024-01-09 PROBLEM — S62.336A DISPLACED FRACTURE OF NECK OF FIFTH METACARPAL BONE, RIGHT HAND, INITIAL ENCOUNTER FOR CLOSED FRACTURE: Status: RESOLVED | Noted: 2021-06-11 | Resolved: 2024-01-09

## 2024-01-09 PROCEDURE — 99213 OFFICE O/P EST LOW 20 MIN: CPT | Mod: 25 | Performed by: PHYSICIAN ASSISTANT

## 2024-01-09 PROCEDURE — 90471 IMMUNIZATION ADMIN: CPT | Performed by: PHYSICIAN ASSISTANT

## 2024-01-09 PROCEDURE — 90686 IIV4 VACC NO PRSV 0.5 ML IM: CPT | Performed by: PHYSICIAN ASSISTANT

## 2024-01-09 ASSESSMENT — PATIENT HEALTH QUESTIONNAIRE - PHQ9
SUM OF ALL RESPONSES TO PHQ QUESTIONS 1-9: 4
5. POOR APPETITE OR OVEREATING: 1 - SEVERAL DAYS
CLINICAL INTERPRETATION OF PHQ2 SCORE: 1

## 2024-01-09 NOTE — PROGRESS NOTES
Subjective:   Clair Garrett is a 23 y.o. female here today for bipolar ADHD and to establish care.    Bipolar 2 disorder (HCC)  This is a pleasant 23-year-old female here today with her mother, Shirley.  She is here today to establish care.  History of bipolar and ADHD.  Diagnosed by psychiatry in the past.  She is currently on no medications.  Does not want to start medications.  Would like to see a counselor.  Scored low on her PHQ-9.       Current medicines (including changes today)  Current Outpatient Medications   Medication Sig Dispense Refill    SUMAtriptan (IMITREX) 25 MG Tab tablet Take 2 Tablets by mouth one time as needed for Migraine for up to 1 dose. 10 Tablet 2     No current facility-administered medications for this visit.     She  has a past medical history of ADHD, ADHD (attention deficit hyperactivity disorder) (4/17/2011), Bipolar disorder (HCC), Mood disorder (HCC) (4/17/2011), and Sensory disorder.    Social History and Family History were reviewed and updated.    ROS   No chest pain, no shortness of breath, no abdominal pain and all other systems were reviewed and are negative.       Objective:     There were no vitals taken for this visit. There is no height or weight on file to calculate BMI.   Physical Exam:  Constitutional: Alert, no distress.  Skin: Warm, dry, good turgor, no rashes in visible areas.  Eye: Equal, round and reactive, conjunctiva clear, lids normal.  ENMT: Lips without lesions, good dentition, oropharynx clear.  Neck: Trachea midline, no masses.   Psych: Alert and oriented x3, normal affect and mood.        Assessment and Plan:   The following treatment plan was discussed    1. Bipolar 2 disorder (HCC)  Chronic condition.  Stable.  Referred to psychology to establish care.  - Referral to Psychology    2. Attention deficit hyperactivity disorder (ADHD), unspecified ADHD type  Chronic condition.  Stable.  Currently working full-time at [employer].  Referred to  psychology to establish care.    - Referral to Psychology    3. Adopted person  Updated medical records.    4. Need for immunization against influenza  Administer without complaints.  - Influenza Vaccine Quad Injection (PF)     Follow-up with Maegan Bishop for Pap smear.    Followup: Return in about 3 months (around 4/9/2024), or if symptoms worsen or fail to improve, for Also Pap with Maegan Bishop.    Please note that this dictation was created using voice recognition software. I have made every reasonable attempt to correct obvious errors, but I expect that there are errors of grammar and possibly content that I did not discover before finalizing the note.

## 2024-01-09 NOTE — ASSESSMENT & PLAN NOTE
This is a pleasant 23-year-old female here today with her mother, Shirley.  She is here today to establish care.  History of bipolar and ADHD.  Diagnosed by psychiatry in the past.  She is currently on no medications.  Does not want to start medications.  Would like to see a counselor.  Scored low on her PHQ-9.

## 2024-01-09 NOTE — LETTER
January 9, 2024         Patient: Clair Garrett   YOB: 2000   Date of Visit: 1/9/2024           To Whom it May Concern:    Clair Garrett was seen in my clinic on 1/9/2024. She may return to work on 1/9/2024.    If you have any questions or concerns, please don't hesitate to call.        Sincerely,           Demarcus Bermeo P.A.-C.  Electronically Signed

## 2024-04-19 ENCOUNTER — OFFICE VISIT (OUTPATIENT)
Dept: MEDICAL GROUP | Facility: MEDICAL CENTER | Age: 24
End: 2024-04-19
Payer: COMMERCIAL

## 2024-04-19 ENCOUNTER — HOSPITAL ENCOUNTER (OUTPATIENT)
Facility: MEDICAL CENTER | Age: 24
End: 2024-04-19
Attending: FAMILY MEDICINE
Payer: COMMERCIAL

## 2024-04-19 VITALS
DIASTOLIC BLOOD PRESSURE: 62 MMHG | HEIGHT: 69 IN | WEIGHT: 194 LBS | TEMPERATURE: 97.3 F | HEART RATE: 68 BPM | SYSTOLIC BLOOD PRESSURE: 114 MMHG | OXYGEN SATURATION: 98 % | BODY MASS INDEX: 28.73 KG/M2

## 2024-04-19 DIAGNOSIS — Z01.419 ENCOUNTER FOR WELL WOMAN EXAM WITH ROUTINE GYNECOLOGICAL EXAM: ICD-10-CM

## 2024-04-19 PROCEDURE — 99395 PREV VISIT EST AGE 18-39: CPT | Performed by: FAMILY MEDICINE

## 2024-04-19 PROCEDURE — 88175 CYTOPATH C/V AUTO FLUID REDO: CPT

## 2024-04-19 PROCEDURE — 3074F SYST BP LT 130 MM HG: CPT | Performed by: FAMILY MEDICINE

## 2024-04-19 PROCEDURE — 99459 PELVIC EXAMINATION: CPT | Performed by: FAMILY MEDICINE

## 2024-04-19 PROCEDURE — 3078F DIAST BP <80 MM HG: CPT | Performed by: FAMILY MEDICINE

## 2024-04-19 ASSESSMENT — FIBROSIS 4 INDEX: FIB4 SCORE: 0.37

## 2024-04-19 NOTE — ASSESSMENT & PLAN NOTE
Anticipatory guidance:  -Discussed  breast self exam, feminine hygiene     Health maintenance: Due for Pap

## 2024-04-20 DIAGNOSIS — Z01.419 ENCOUNTER FOR WELL WOMAN EXAM WITH ROUTINE GYNECOLOGICAL EXAM: ICD-10-CM

## 2024-04-23 LAB
COMMENT NL11729A: NORMAL
CYTOLOGIST CVX/VAG CYTO: NORMAL
CYTOLOGY CVX/VAG DOC CYTO: NORMAL
CYTOLOGY CVX/VAG DOC THIN PREP: NORMAL
NOTE NL11727A: NORMAL
OTHER STN SPEC: NORMAL
STAT OF ADQ CVX/VAG CYTO-IMP: NORMAL

## 2025-01-13 ENCOUNTER — APPOINTMENT (OUTPATIENT)
Dept: MEDICAL GROUP | Facility: MEDICAL CENTER | Age: 25
End: 2025-01-13
Payer: COMMERCIAL

## 2025-02-28 ENCOUNTER — OFFICE VISIT (OUTPATIENT)
Dept: MEDICAL GROUP | Facility: MEDICAL CENTER | Age: 25
End: 2025-02-28
Payer: COMMERCIAL

## 2025-02-28 VITALS
WEIGHT: 217.6 LBS | DIASTOLIC BLOOD PRESSURE: 72 MMHG | BODY MASS INDEX: 31.15 KG/M2 | SYSTOLIC BLOOD PRESSURE: 112 MMHG | TEMPERATURE: 97.1 F | OXYGEN SATURATION: 97 % | HEART RATE: 100 BPM | HEIGHT: 70 IN

## 2025-02-28 DIAGNOSIS — J06.9 VIRAL URI WITH COUGH: ICD-10-CM

## 2025-02-28 DIAGNOSIS — H66.003 NON-RECURRENT ACUTE SUPPURATIVE OTITIS MEDIA OF BOTH EARS WITHOUT SPONTANEOUS RUPTURE OF TYMPANIC MEMBRANES: ICD-10-CM

## 2025-02-28 PROCEDURE — 3078F DIAST BP <80 MM HG: CPT | Performed by: FAMILY MEDICINE

## 2025-02-28 PROCEDURE — 3074F SYST BP LT 130 MM HG: CPT | Performed by: FAMILY MEDICINE

## 2025-02-28 PROCEDURE — 0241U POCT CEPHEID COV-2, FLU A/B, RSV - PCR: CPT | Performed by: FAMILY MEDICINE

## 2025-02-28 PROCEDURE — 99213 OFFICE O/P EST LOW 20 MIN: CPT | Performed by: FAMILY MEDICINE

## 2025-02-28 RX ORDER — BENZONATATE 100 MG/1
100 CAPSULE ORAL 3 TIMES DAILY PRN
Qty: 60 CAPSULE | Refills: 0 | Status: SHIPPED | OUTPATIENT
Start: 2025-02-28

## 2025-02-28 RX ORDER — PREDNISONE 20 MG/1
TABLET ORAL
Qty: 15 TABLET | Refills: 0 | Status: SHIPPED | OUTPATIENT
Start: 2025-02-28 | End: 2025-03-07

## 2025-02-28 ASSESSMENT — PATIENT HEALTH QUESTIONNAIRE - PHQ9: CLINICAL INTERPRETATION OF PHQ2 SCORE: 0

## 2025-02-28 ASSESSMENT — FIBROSIS 4 INDEX: FIB4 SCORE: 0.39

## 2025-02-28 NOTE — LETTER
February 28, 2025    To Whom It May Concern:         This is confirmation that Clair Garrett attended her scheduled appointment with JOSEPH Washington on 2/28/25.  Please excuse Meghan from work starting on 02/25/2025.  She is okay to return to work once her symptoms have improved and fever free without the use of tylenol and motrin for 24 hours.          If you have any questions please do not hesitate to call me at the phone number listed below.    Sincerely,          SHIRA Washington.  [phone]

## 2025-03-01 NOTE — PROGRESS NOTES
Verbal consent was acquired by the patient to use [employer] ambient listening note generation during this visit:  Yes      Chief complaint::Diagnoses of Viral URI with cough and Non-recurrent acute suppurative otitis media of both ears without spontaneous rupture of tympanic membranes were pertinent to this visit.    Assessment and Plan:   The following treatment plan was discussed:    Assessment & Plan         Gloria was seen today for sore throat, headache, cough and nasal congestion.    Diagnoses and all orders for this visit:    Viral URI with cough  -     POCT CoV-2, Flu A/B, RSV by PCR  -     amoxicillin-clavulanate (AUGMENTIN) 875-125 MG Tab; Take 1 Tablet by mouth 2 times a day for 7 days.  -     predniSONE (DELTASONE) 20 MG Tab; Take 3 Tablets by mouth every day for 3 days, THEN 2 Tablets every day for 2 days, THEN 1 Tablet every day for 2 days.  -     benzonatate (TESSALON) 100 MG Cap; Take 1 Capsule by mouth 3 times a day as needed for Cough.    Non-recurrent acute suppurative otitis media of both ears without spontaneous rupture of tympanic membranes        Followup: Return if symptoms worsen or fail to improve.    I have placed Cepheid nasal swab orders.  The MA is preforming Cepheid nasal swab orders under the direction of Dr. Salguero  Subjective/HPI:     HPI:    Clair Garrett is a pleasant 24 y.o. female here for   Chief Complaint   Patient presents with    Sore Throat     X 3 days, fever    Headache    Cough    Nasal Congestion        History of Present Illness  24-year-old individual with URI symptoms for 3 days. Severe migraines began on 02/25/2025, followed by sore throat, intense headaches, rhinorrhea, cough, and fever. Fever confirmed by their grandmother. Self-medicating with Tylenol, ibuprofen, Advil, and allergy relief, with some relief from Tylenol and ibuprofen. Nasal breathing exacerbates pain. Frequent coughing episodes. No dental pain during ambulation. Absent from work for  "3 days, requires a note. Uncertain if cough is pharyngeal or thoracic. Bending over causes pain, necessitating knee bending to lift packages. History of severe ear infections and streptococcal pharyngitis, improved post-tonsillectomy.    ALLERGIES  No known allergies.    MEDICATIONS  Current: Tylenol, ibuprofen, Advil, allergy relief    Current Medicines (including changes today)  Current Outpatient Medications   Medication Sig Dispense Refill    amoxicillin-clavulanate (AUGMENTIN) 875-125 MG Tab Take 1 Tablet by mouth 2 times a day for 7 days. 14 Tablet 0    predniSONE (DELTASONE) 20 MG Tab Take 3 Tablets by mouth every day for 3 days, THEN 2 Tablets every day for 2 days, THEN 1 Tablet every day for 2 days. 15 Tablet 0    benzonatate (TESSALON) 100 MG Cap Take 1 Capsule by mouth 3 times a day as needed for Cough. 60 Capsule 0    SUMAtriptan (IMITREX) 25 MG Tab tablet Take 2 Tablets by mouth one time as needed for Migraine for up to 1 dose. 10 Tablet 2     No current facility-administered medications for this visit.     Past Medical/ Surgical History  She  has a past medical history of ADHD, ADHD (attention deficit hyperactivity disorder) (4/17/2011), Bipolar disorder ([facility]), Mood disorder (HCC) (4/17/2011), and Sensory disorder.  She  has a past surgical history that includes tonsillectomy.       Objective:   /72 (BP Location: Left arm, Patient Position: Sitting, BP Cuff Size: Adult)   Pulse 100   Temp 36.2 °C (97.1 °F) (Temporal)   Ht 1.778 m (5' 10\")   Wt 98.7 kg (217 lb 9.6 oz)   SpO2 97%  Body mass index is 31.22 kg/m².    Physical Exam  Constitutional:       General: She is not in acute distress.     Appearance: She is ill-appearing. She is not toxic-appearing.   HENT:      Head: Normocephalic.      Right Ear: External ear normal. Tympanic membrane is injected and erythematous. Tympanic membrane is not bulging.      Left Ear: External ear normal. Tympanic membrane is injected and erythematous. " Tympanic membrane is not bulging.      Nose: Congestion present. No rhinorrhea.      Right Nostril: Occlusion present.      Right Turbinates: Swollen.      Left Turbinates: Swollen.      Right Sinus: No maxillary sinus tenderness or frontal sinus tenderness.      Left Sinus: Maxillary sinus tenderness present. No frontal sinus tenderness.      Mouth/Throat:      Mouth: Mucous membranes are moist.      Pharynx: Oropharynx is clear. Posterior oropharyngeal erythema and postnasal drip present.   Eyes:      General:         Right eye: No discharge.         Left eye: No discharge.      Conjunctiva/sclera: Conjunctivae normal.      Pupils: Pupils are equal, round, and reactive to light.   Cardiovascular:      Rate and Rhythm: Normal rate and regular rhythm.      Heart sounds: No murmur heard.  Pulmonary:      Effort: Pulmonary effort is normal. No respiratory distress.      Breath sounds: Normal breath sounds. No wheezing.   Abdominal:      General: Abdomen is flat.   Musculoskeletal:         General: No swelling or tenderness.      Cervical back: Normal range of motion and neck supple.      Right lower leg: No edema.      Left lower leg: No edema.   Lymphadenopathy:      Cervical: No cervical adenopathy.   Skin:     General: Skin is warm.   Neurological:      General: No focal deficit present.      Mental Status: She is alert and oriented to person, place, and time. Mental status is at baseline.   Psychiatric:         Mood and Affect: Mood normal.          Lab/ Imaging Results:  No labs or imaging to review    Please note that this dictation was created using voice recognition software. I have made every reasonable attempt to correct obvious errors, but I expect that there are errors of grammar and possibly content that I did not discover before finalizing the note.       time. Mental status is at baseline.   Psychiatric:         Mood and Affect: Mood normal.          Lab/ Imaging Results:  No labs or imaging to review    Please note that this dictation was created using voice recognition software. I have made every reasonable attempt to correct obvious errors, but I expect that there are errors of grammar and possibly content that I did not discover before finalizing the note.

## 2025-07-17 ENCOUNTER — OFFICE VISIT (OUTPATIENT)
Dept: MEDICAL GROUP | Facility: MEDICAL CENTER | Age: 25
End: 2025-07-17
Payer: COMMERCIAL

## 2025-07-17 VITALS
SYSTOLIC BLOOD PRESSURE: 110 MMHG | HEART RATE: 96 BPM | HEIGHT: 70 IN | DIASTOLIC BLOOD PRESSURE: 68 MMHG | BODY MASS INDEX: 34.36 KG/M2 | WEIGHT: 240 LBS | OXYGEN SATURATION: 97 % | TEMPERATURE: 98.3 F

## 2025-07-17 DIAGNOSIS — K29.70 VIRAL GASTRITIS: ICD-10-CM

## 2025-07-17 DIAGNOSIS — M54.50 CHRONIC BILATERAL LOW BACK PAIN, UNSPECIFIED WHETHER SCIATICA PRESENT: Primary | ICD-10-CM

## 2025-07-17 DIAGNOSIS — G89.29 CHRONIC BILATERAL LOW BACK PAIN, UNSPECIFIED WHETHER SCIATICA PRESENT: Primary | ICD-10-CM

## 2025-07-17 PROCEDURE — 3074F SYST BP LT 130 MM HG: CPT | Performed by: FAMILY MEDICINE

## 2025-07-17 PROCEDURE — 3078F DIAST BP <80 MM HG: CPT | Performed by: FAMILY MEDICINE

## 2025-07-17 PROCEDURE — 99213 OFFICE O/P EST LOW 20 MIN: CPT | Performed by: FAMILY MEDICINE

## 2025-07-17 RX ORDER — ONDANSETRON 4 MG/1
4 TABLET, ORALLY DISINTEGRATING ORAL EVERY 6 HOURS PRN
Qty: 10 TABLET | Refills: 0 | Status: SHIPPED | OUTPATIENT
Start: 2025-07-17

## 2025-07-17 RX ORDER — ONDANSETRON 4 MG/1
4 TABLET, ORALLY DISINTEGRATING ORAL EVERY 6 HOURS PRN
Qty: 10 TABLET | Refills: 0 | Status: SHIPPED | OUTPATIENT
Start: 2025-07-17 | End: 2025-07-17

## 2025-07-17 RX ORDER — LOPERAMIDE HYDROCHLORIDE 2 MG/1
2 CAPSULE ORAL 4 TIMES DAILY PRN
Qty: 30 CAPSULE | Refills: 0 | Status: SHIPPED | OUTPATIENT
Start: 2025-07-17

## 2025-07-17 RX ORDER — LOPERAMIDE HYDROCHLORIDE 2 MG/1
2 CAPSULE ORAL 4 TIMES DAILY PRN
Qty: 30 CAPSULE | Refills: 0 | Status: SHIPPED | OUTPATIENT
Start: 2025-07-17 | End: 2025-07-17

## 2025-07-17 ASSESSMENT — FIBROSIS 4 INDEX: FIB4 SCORE: 0.39

## 2025-07-17 NOTE — LETTER
July 17, 2025    To Whom It May Concern:         This is confirmation that Clair Garrett attended her scheduled appointment with JOSEPH Washington on 7/17/25.  She is okay to return to work on Sunday or until her symptoms improve         If you have any questions please do not hesitate to call me at the phone number listed below.    Sincerely,          SHIRA Washington.  [phone]

## 2025-07-17 NOTE — PROGRESS NOTES
Verbal consent was acquired by the patient to use [employer] ambient listening note generation during this visit:  Yes      Chief complaint::The primary encounter diagnosis was Chronic bilateral low back pain, unspecified whether sciatica present. A diagnosis of Viral gastritis was also pertinent to this visit.    Assessment and Plan:   The following treatment plan was discussed:     Assessment & Plan  1. Viral gastroenteritis: Acute unstable.  - Symptoms suggest viral gastroenteritis. Appendix not involved.  - Prescriptions: Zofran 4 mg orally disintegrating tablets, loperamide 2 mg.  - Advise avoiding dairy, consuming chicken noodle soup, abstaining from work until diarrhea subsides to prevent norovirus spread.  - Encourage hydration with Gatorade or Pedialyte, ginger for nausea.    2. Back strain chronic stable.  - Likely due to squatting and lifting at work.  - Advise rest and avoiding activities that exacerbate strain.    Follow-up  - Provide doctor's note for work.  Gloria was seen today for abdominal pain, nausea, diarrhea, emesis, headache and body aches.    Diagnoses and all orders for this visit:    Chronic bilateral low back pain, unspecified whether sciatica present    Viral gastritis  -     Discontinue: ondansetron (ZOFRAN ODT) 4 MG TABLET DISPERSIBLE; Take 1 Tablet by mouth every 6 hours as needed for Nausea/Vomiting.  -     Discontinue: loperamide (IMODIUM) 2 MG Cap; Take 1 Capsule by mouth 4 times a day as needed for Diarrhea.  -     loperamide (IMODIUM) 2 MG Cap; Take 1 Capsule by mouth 4 times a day as needed for Diarrhea.  -     ondansetron (ZOFRAN ODT) 4 MG TABLET DISPERSIBLE; Take 1 Tablet by mouth every 6 hours as needed for Nausea/Vomiting.        Followup: Return if symptoms worsen or fail to improve.    Subjective/HPI:     HPI:    Clair Garrett is a pleasant 24 y.o. female here for   Chief Complaint   Patient presents with    Abdominal Pain    Nausea    Diarrhea    Emesis     "Headache    Body Aches        History of Present Illness  The patient presents with abdominal complaints and back pain.    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  - Returned to work yesterday but had to leave again after vomiting four times  - Concerned about COVID-19  - Reports no recent injuries or changes in bowel movements  - No known food or medication allergies  - Regularly donates plasma and is curious if this could be contributing to symptoms  - Requests a note for their employer as they will be unable to work tonight    Back Pain  - Experienced back pain and cramping since Saturday  - Pain intensifies when bending backwards  - Pain rated as 3-4/10, increasing to 7-8/10 during cramping episodes  - First experience with backaches  - Suspects anxiety or lifting heavy objects at work might be causing the back cramps    Occupations: Fast-food worker  Tobacco: Smokes cigarettes.    Current Medicines (including changes today)  Current Medications[1]  Past Medical/ Surgical History  She  has a past medical history of ADHD, ADHD (attention deficit hyperactivity disorder) (4/17/2011), Bipolar disorder ([facility]), Mood disorder (HCC) (4/17/2011), and Sensory disorder.  She  has a past surgical history that includes tonsillectomy.       Objective:   /68   Pulse 96   Temp 36.8 °C (98.3 °F)   Ht 1.778 m (5' 10\")   Wt 109 kg (240 lb)   SpO2 97%  Body mass index is 34.44 kg/m².    Physical Exam  Constitutional:       General: She is not in acute distress.     Appearance: She is not ill-appearing or toxic-appearing.   HENT:      Head: Normocephalic.      Right Ear: Tympanic membrane and external ear normal.      Left Ear: Tympanic membrane and external ear " normal.      Nose: Nose normal. No rhinorrhea.      Mouth/Throat:      Mouth: Mucous membranes are moist.      Pharynx: Oropharynx is clear. No posterior oropharyngeal erythema.   Eyes:      General:         Right eye: No discharge.         Left eye: No discharge.      Conjunctiva/sclera: Conjunctivae normal.      Pupils: Pupils are equal, round, and reactive to light.   Cardiovascular:      Rate and Rhythm: Normal rate and regular rhythm.      Heart sounds: No murmur heard.  Pulmonary:      Effort: Pulmonary effort is normal. No respiratory distress.      Breath sounds: Normal breath sounds. No wheezing.   Abdominal:      General: Abdomen is flat. Bowel sounds are normal. There is no distension (obese).      Palpations: Abdomen is soft.      Tenderness: There is no abdominal tenderness. Negative signs include Nunez's sign.   Musculoskeletal:         General: No swelling or tenderness.      Cervical back: Normal range of motion and neck supple.      Right lower leg: No edema.      Left lower leg: No edema.   Skin:     General: Skin is warm.   Neurological:      General: No focal deficit present.      Mental Status: She is alert and oriented to person, place, and time. Mental status is at baseline.   Psychiatric:         Mood and Affect: Mood normal.          Lab/ Imaging Results:  No labs or imaging to review    Please note that this dictation was created using voice recognition software. I have made every reasonable attempt to correct obvious errors, but I expect that there are errors of grammar and possibly content that I did not discover before finalizing the note.           [1]   Current Outpatient Medications   Medication Sig Dispense Refill    loperamide (IMODIUM) 2 MG Cap Take 1 Capsule by mouth 4 times a day as needed for Diarrhea. 30 Capsule 0    ondansetron (ZOFRAN ODT) 4 MG TABLET DISPERSIBLE Take 1 Tablet by mouth every 6 hours as needed for Nausea/Vomiting. 10 Tablet 0    benzonatate (TESSALON) 100 MG  Cap Take 1 Capsule by mouth 3 times a day as needed for Cough. 60 Capsule 0    SUMAtriptan (IMITREX) 25 MG Tab tablet Take 2 Tablets by mouth one time as needed for Migraine for up to 1 dose. 10 Tablet 2     No current facility-administered medications for this visit.

## 2025-08-13 ENCOUNTER — OCCUPATIONAL MEDICINE (OUTPATIENT)
Dept: URGENT CARE | Facility: CLINIC | Age: 25
End: 2025-08-13
Payer: COMMERCIAL

## 2025-08-13 ENCOUNTER — APPOINTMENT (OUTPATIENT)
Dept: RADIOLOGY | Facility: IMAGING CENTER | Age: 25
End: 2025-08-13
Attending: PHYSICIAN ASSISTANT
Payer: COMMERCIAL

## 2025-08-13 VITALS
OXYGEN SATURATION: 95 % | WEIGHT: 240 LBS | RESPIRATION RATE: 18 BRPM | TEMPERATURE: 98.6 F | DIASTOLIC BLOOD PRESSURE: 82 MMHG | SYSTOLIC BLOOD PRESSURE: 126 MMHG | BODY MASS INDEX: 36.37 KG/M2 | HEART RATE: 85 BPM | HEIGHT: 68 IN

## 2025-08-13 DIAGNOSIS — S97.82XA CRUSHING INJURY OF LEFT FOOT, INITIAL ENCOUNTER: ICD-10-CM

## 2025-08-13 DIAGNOSIS — S97.82XA CRUSHING INJURY OF LEFT FOOT, INITIAL ENCOUNTER: Primary | ICD-10-CM

## 2025-08-13 DIAGNOSIS — S90.32XA CONTUSION OF LEFT FOOT, INITIAL ENCOUNTER: ICD-10-CM

## 2025-08-13 PROCEDURE — 73630 X-RAY EXAM OF FOOT: CPT | Mod: TC,LT | Performed by: RADIOLOGY

## 2025-08-13 PROCEDURE — 3079F DIAST BP 80-89 MM HG: CPT | Performed by: PHYSICIAN ASSISTANT

## 2025-08-13 PROCEDURE — 3074F SYST BP LT 130 MM HG: CPT | Performed by: PHYSICIAN ASSISTANT

## 2025-08-13 PROCEDURE — 99214 OFFICE O/P EST MOD 30 MIN: CPT | Performed by: PHYSICIAN ASSISTANT

## 2025-08-13 ASSESSMENT — FIBROSIS 4 INDEX: FIB4 SCORE: 0.39

## 2025-08-17 ENCOUNTER — OCCUPATIONAL MEDICINE (OUTPATIENT)
Dept: URGENT CARE | Facility: CLINIC | Age: 25
End: 2025-08-17
Payer: COMMERCIAL

## 2025-08-17 VITALS
OXYGEN SATURATION: 96 % | SYSTOLIC BLOOD PRESSURE: 124 MMHG | DIASTOLIC BLOOD PRESSURE: 86 MMHG | HEIGHT: 70 IN | HEART RATE: 90 BPM | TEMPERATURE: 97 F | WEIGHT: 240 LBS | BODY MASS INDEX: 34.36 KG/M2 | RESPIRATION RATE: 18 BRPM

## 2025-08-17 DIAGNOSIS — S90.32XD CONTUSION OF LEFT FOOT, SUBSEQUENT ENCOUNTER: Primary | ICD-10-CM

## 2025-08-17 PROCEDURE — 99213 OFFICE O/P EST LOW 20 MIN: CPT

## 2025-08-17 PROCEDURE — 3079F DIAST BP 80-89 MM HG: CPT

## 2025-08-17 PROCEDURE — 3074F SYST BP LT 130 MM HG: CPT

## 2025-08-17 ASSESSMENT — FIBROSIS 4 INDEX: FIB4 SCORE: 0.4
